# Patient Record
Sex: MALE | Race: WHITE | NOT HISPANIC OR LATINO | Employment: FULL TIME | URBAN - METROPOLITAN AREA
[De-identification: names, ages, dates, MRNs, and addresses within clinical notes are randomized per-mention and may not be internally consistent; named-entity substitution may affect disease eponyms.]

---

## 2023-04-03 PROBLEM — F39 MOOD DISORDER (CMS-HCC): Status: ACTIVE | Noted: 2023-04-03

## 2023-04-03 PROBLEM — U07.1 COVID-19: Status: ACTIVE | Noted: 2023-04-03

## 2023-04-03 PROBLEM — E66.01 CLASS 2 SEVERE OBESITY DUE TO EXCESS CALORIES WITH SERIOUS COMORBIDITY AND BODY MASS INDEX (BMI) OF 37.0 TO 37.9 IN ADULT (MULTI): Status: ACTIVE | Noted: 2023-04-03

## 2023-04-03 PROBLEM — E66.812 CLASS 2 SEVERE OBESITY DUE TO EXCESS CALORIES WITH SERIOUS COMORBIDITY AND BODY MASS INDEX (BMI) OF 37.0 TO 37.9 IN ADULT: Status: ACTIVE | Noted: 2023-04-03

## 2023-04-03 PROBLEM — J30.2 SEASONAL ALLERGIES: Status: ACTIVE | Noted: 2023-04-03

## 2023-04-03 RX ORDER — LITHIUM CARBONATE 300 MG/1
TABLET, FILM COATED, EXTENDED RELEASE ORAL
COMMUNITY
Start: 2022-08-12

## 2023-04-03 RX ORDER — LAMOTRIGINE 100 MG/1
1 TABLET ORAL DAILY
COMMUNITY
Start: 2022-08-12

## 2023-04-03 RX ORDER — ALBUTEROL SULFATE 90 UG/1
AEROSOL, METERED RESPIRATORY (INHALATION)
COMMUNITY
Start: 2022-08-12 | End: 2023-12-07 | Stop reason: WASHOUT

## 2023-04-03 RX ORDER — CLONAZEPAM 0.5 MG/1
TABLET ORAL
COMMUNITY
Start: 2022-08-12

## 2023-04-03 RX ORDER — CETIRIZINE HYDROCHLORIDE 10 MG/1
1 TABLET ORAL DAILY
COMMUNITY
Start: 2022-08-12 | End: 2023-12-07 | Stop reason: WASHOUT

## 2023-04-04 ENCOUNTER — OFFICE VISIT (OUTPATIENT)
Dept: PRIMARY CARE | Facility: CLINIC | Age: 33
End: 2023-04-04
Payer: COMMERCIAL

## 2023-04-04 VITALS
WEIGHT: 287 LBS | SYSTOLIC BLOOD PRESSURE: 127 MMHG | BODY MASS INDEX: 38.92 KG/M2 | HEART RATE: 76 BPM | DIASTOLIC BLOOD PRESSURE: 82 MMHG

## 2023-04-04 DIAGNOSIS — J30.2 SEASONAL ALLERGIES: ICD-10-CM

## 2023-04-04 DIAGNOSIS — H61.23 BILATERAL IMPACTED CERUMEN: ICD-10-CM

## 2023-04-04 DIAGNOSIS — R23.8 OTHER SKIN CHANGES: ICD-10-CM

## 2023-04-04 DIAGNOSIS — E66.01 CLASS 2 SEVERE OBESITY DUE TO EXCESS CALORIES WITH SERIOUS COMORBIDITY AND BODY MASS INDEX (BMI) OF 37.0 TO 37.9 IN ADULT (MULTI): ICD-10-CM

## 2023-04-04 DIAGNOSIS — J34.89 SINUS PRESSURE: Primary | ICD-10-CM

## 2023-04-04 DIAGNOSIS — F39 MOOD DISORDER (CMS-HCC): ICD-10-CM

## 2023-04-04 PROCEDURE — 3008F BODY MASS INDEX DOCD: CPT | Performed by: INTERNAL MEDICINE

## 2023-04-04 PROCEDURE — 99214 OFFICE O/P EST MOD 30 MIN: CPT | Performed by: INTERNAL MEDICINE

## 2023-04-04 PROCEDURE — 1036F TOBACCO NON-USER: CPT | Performed by: INTERNAL MEDICINE

## 2023-04-04 ASSESSMENT — PATIENT HEALTH QUESTIONNAIRE - PHQ9
2. FEELING DOWN, DEPRESSED OR HOPELESS: SEVERAL DAYS
1. LITTLE INTEREST OR PLEASURE IN DOING THINGS: SEVERAL DAYS
SUM OF ALL RESPONSES TO PHQ9 QUESTIONS 1 AND 2: 2
10. IF YOU CHECKED OFF ANY PROBLEMS, HOW DIFFICULT HAVE THESE PROBLEMS MADE IT FOR YOU TO DO YOUR WORK, TAKE CARE OF THINGS AT HOME, OR GET ALONG WITH OTHER PEOPLE: SOMEWHAT DIFFICULT

## 2023-04-04 NOTE — ASSESSMENT & PLAN NOTE
Related to flying in an airplane.  At this point considering symptoms have been present since the flight will check imaging is of the sinus for evaluation

## 2023-04-04 NOTE — PROGRESS NOTES
Subjective   Patient ID: Rashid Reyes is a 32 y.o. male who presents for Follow-up (E.R-follow up).    HPI     Patient is a 32-year-old male with past medical history of major depression seasonal allergies obesity who presents with chief complaint of headache that began after recent flight.  He denies any nasal congestion but does feel pain behind the maxillary sinus.  No drainage of the ears.  He went to the emergency room and a CT of the head was unremarkable.  He states that today the headache affecting the frontal sinuses on the right is about 80% better from when it initiated during the flight 1 month ago.  No fevers and chills and no significant drainage from the nasal passageways    Review of Systems  Constitutional: No fever or chills  Cardiovascular: no chest pain, no palpitations and no syncope.   Respiratory: no cough, no shortness of breath during exertion and no shortness of breath at rest.   Gastrointestinal: no abdominal pain, no nausea and no vomiting.  Neuro: No Headache, no dizziness    Objective   /82   Pulse 76   Wt 130 kg (287 lb)   BMI 38.92 kg/m²     Physical Exam  Constitutional: Alert and in no acute distress. Well developed, well nourished  Head and Face: Head and face: Normal.    Cardiovascular: Heart rate and rhythm were normal, normal S1 and S2. No peripheral edema.   Pulmonary: No respiratory distress. Clear bilateral breath sounds.  Musculoskeletal: Gait and station: Normal. Muscle strength/tone: Normal.   Skin: Normal skin color and pigmentation, normal skin turgor, and no rash.    Psychiatric: Judgment and insight: Intact. Mood and affect: Normal.        Lab Results   Component Value Date    WBC 4.7 10/06/2022    HGB 15.5 10/06/2022    HCT 47.3 10/06/2022     10/06/2022    CHOL 213 (H) 10/06/2022    TRIG 234 (H) 10/06/2022    HDL 35.3 (A) 10/06/2022    ALT 18 10/06/2022    AST 21 10/06/2022     10/06/2022    K 4.2 10/06/2022     10/06/2022     CREATININE 1.15 10/06/2022    BUN 16 10/06/2022    CO2 27 10/06/2022       No image results found.            Assessment/Plan

## 2023-04-04 NOTE — PROGRESS NOTES
Subjective   Patient ID: Rashid Reyes is a 32 y.o. male who presents for Follow-up (E.R-follow up).    HPI     Patient is a 32-year-old male with past medical history of major depression seasonal allergies obesity who presents with chief complaint of headache that began after recent flight.  He denies any nasal congestion but does feel pain behind the maxillary sinus.  No drainage of the ears.  He went to the emergency room and a CT of the head was unremarkable.  He states that today the headache affecting the frontal sinuses on the right is about 80% better from when it initiated during the flight 1 month ago.  No fevers and chills and no significant drainage from the nasal passageways  Review of Systems  Constitutional: No fever or chills  Cardiovascular: no chest pain, no palpitations and no syncope.   Respiratory: no cough, no shortness of breath during exertion and no shortness of breath at rest.   Gastrointestinal: no abdominal pain, no nausea and no vomiting.  Neuro: No Headache, no dizziness    Objective   /82   Pulse 76   Wt 130 kg (287 lb)   BMI 38.92 kg/m²     Physical Exam  Constitutional: Alert and in no acute distress. Well developed, well nourished  Head and Face: Head and face: Normal.    Cardiovascular: Heart rate and rhythm were normal, normal S1 and S2. No peripheral edema.   Pulmonary: No respiratory distress. Clear bilateral breath sounds.  Musculoskeletal: Gait and station: Normal. Muscle strength/tone: Normal.   Skin: Normal skin color and pigmentation, normal skin turgor, and no rash.    Psychiatric: Judgment and insight: Intact. Mood and affect: Normal.        Lab Results   Component Value Date    WBC 4.7 10/06/2022    HGB 15.5 10/06/2022    HCT 47.3 10/06/2022     10/06/2022    CHOL 213 (H) 10/06/2022    TRIG 234 (H) 10/06/2022    HDL 35.3 (A) 10/06/2022    ALT 18 10/06/2022    AST 21 10/06/2022     10/06/2022    K 4.2 10/06/2022     10/06/2022     CREATININE 1.15 10/06/2022    BUN 16 10/06/2022    CO2 27 10/06/2022       No image results found.            Assessment/Plan   Problem List Items Addressed This Visit          Endocrine/Metabolic    Class 2 severe obesity due to excess calories with serious comorbidity and body mass index (BMI) of 37.0 to 37.9 in adult (CMS/HCC)       Other    Mood disorder (CMS/[facility])     Stable controlled.  Continue following with psychiatry         Seasonal allergies    Sinus pressure - Primary     Related to flying in an airplane.  At this point considering symptoms have been present since the flight will check imaging is of the sinus for evaluation         Relevant Orders    XR paranasal sinuses 3+ views    Referral to ENT    Bilateral impacted cerumen     Referral to ENT.  Advised Debrox 3 times a day for 5 days         Relevant Orders    Referral to ENT    Other skin changes     Referral to dermatology         Relevant Orders    Referral to Dermatology

## 2023-04-11 ENCOUNTER — APPOINTMENT (OUTPATIENT)
Dept: LAB | Facility: LAB | Age: 33
End: 2023-04-11
Payer: COMMERCIAL

## 2023-04-11 LAB
ALBUMIN (G/DL) IN SER/PLAS: 4.4 G/DL (ref 3.4–5)
ANION GAP IN SER/PLAS: 13 MMOL/L (ref 10–20)
CALCIUM (MG/DL) IN SER/PLAS: 9.5 MG/DL (ref 8.6–10.6)
CARBON DIOXIDE, TOTAL (MMOL/L) IN SER/PLAS: 28 MMOL/L (ref 21–32)
CHLORIDE (MMOL/L) IN SER/PLAS: 102 MMOL/L (ref 98–107)
CHOLESTEROL (MG/DL) IN SER/PLAS: 205 MG/DL (ref 0–199)
CHOLESTEROL IN HDL (MG/DL) IN SER/PLAS: 36.3 MG/DL
CHOLESTEROL/HDL RATIO: 5.6
CREATININE (MG/DL) IN SER/PLAS: 1.1 MG/DL (ref 0.5–1.3)
ESTIMATED AVERAGE GLUCOSE FOR HBA1C: 88 MG/DL
GFR MALE: 90 ML/MIN/1.73M2
GLUCOSE (MG/DL) IN SER/PLAS: 81 MG/DL (ref 74–99)
HEMOGLOBIN A1C/HEMOGLOBIN TOTAL IN BLOOD: 4.7 %
LDL: 95 MG/DL (ref 0–99)
LITHIUM (MOL/L) IN SER/PLAS: 0.17 MMOL/L (ref 0.6–1.2)
NON HDL CHOLESTEROL: 169 MG/DL
PHOSPHATE (MG/DL) IN SER/PLAS: 3.4 MG/DL (ref 2.5–4.9)
POTASSIUM (MMOL/L) IN SER/PLAS: 4.7 MMOL/L (ref 3.5–5.3)
SODIUM (MMOL/L) IN SER/PLAS: 138 MMOL/L (ref 136–145)
THYROTROPIN (MIU/L) IN SER/PLAS BY DETECTION LIMIT <= 0.05 MIU/L: 1.95 MIU/L (ref 0.44–3.98)
TRIGLYCERIDE (MG/DL) IN SER/PLAS: 369 MG/DL (ref 0–149)
UREA NITROGEN (MG/DL) IN SER/PLAS: 16 MG/DL (ref 6–23)
VLDL: 74 MG/DL (ref 0–40)

## 2023-04-18 ENCOUNTER — LAB (OUTPATIENT)
Dept: LAB | Facility: LAB | Age: 33
End: 2023-04-18
Payer: COMMERCIAL

## 2023-04-18 LAB — TOBACCO SCREEN, URINE: NEGATIVE

## 2023-07-12 ENCOUNTER — APPOINTMENT (OUTPATIENT)
Dept: PRIMARY CARE | Facility: CLINIC | Age: 33
End: 2023-07-12
Payer: COMMERCIAL

## 2023-08-18 ENCOUNTER — OFFICE VISIT (OUTPATIENT)
Dept: PRIMARY CARE | Facility: CLINIC | Age: 33
End: 2023-08-18
Payer: COMMERCIAL

## 2023-08-18 VITALS
WEIGHT: 299 LBS | SYSTOLIC BLOOD PRESSURE: 114 MMHG | DIASTOLIC BLOOD PRESSURE: 73 MMHG | HEART RATE: 77 BPM | BODY MASS INDEX: 40.55 KG/M2 | TEMPERATURE: 98.8 F

## 2023-08-18 DIAGNOSIS — L98.9 SKIN LESION: ICD-10-CM

## 2023-08-18 DIAGNOSIS — R05.8 OTHER COUGH: ICD-10-CM

## 2023-08-18 DIAGNOSIS — J45.20 MILD INTERMITTENT ASTHMA, UNSPECIFIED WHETHER COMPLICATED (HHS-HCC): Primary | ICD-10-CM

## 2023-08-18 PROCEDURE — 99213 OFFICE O/P EST LOW 20 MIN: CPT | Performed by: INTERNAL MEDICINE

## 2023-08-18 PROCEDURE — 1036F TOBACCO NON-USER: CPT | Performed by: INTERNAL MEDICINE

## 2023-08-18 PROCEDURE — 3008F BODY MASS INDEX DOCD: CPT | Performed by: INTERNAL MEDICINE

## 2023-08-18 RX ORDER — METHYLPREDNISOLONE 4 MG/1
TABLET ORAL
Qty: 21 TABLET | Refills: 0 | Status: SHIPPED | OUTPATIENT
Start: 2023-08-18 | End: 2023-08-25

## 2023-08-18 RX ORDER — BENZONATATE 100 MG/1
100 CAPSULE ORAL 3 TIMES DAILY PRN
Qty: 21 CAPSULE | Refills: 0 | Status: SHIPPED | OUTPATIENT
Start: 2023-08-18 | End: 2023-08-25

## 2023-08-18 ASSESSMENT — ENCOUNTER SYMPTOMS: COUGH: 1

## 2023-08-18 NOTE — ASSESSMENT & PLAN NOTE
Patient likely has acute bronchitis versus asthma exacerbation in the setting of recent upper respiratory tract infection.  Will check chest x-ray.  Start Medrol Dosepak.  Adjust course pending results of x-ray.  Tessalon Perles for cough.  If symptoms get worse please go to the urgent care

## 2023-08-18 NOTE — PROGRESS NOTES
Subjective   Patient ID: Bernard Reyes is a 33 y.o. male who presents for Cough.    Cough         Patient is a 33-year-old male with past medical history of major depression seasonal allergies obesity who presents with chief complaint of ongoing cough x2 weeks.  He does report he had an upper respiratory tract infection 2 weeks ago.  For the most part he still feels a little bit fatigued and a little bit of shortness of breath.  He has been using his inhaler once daily with some improvement but he does not take a second dose later in the day because it prevents him from sleeping.  He denies any fevers chills nausea or vomiting.    Review of Systems  Constitutional: No fever or chills  Cardiovascular: no chest pain, no palpitations and no syncope.   Respiratory: cough, shortness of breath during exertion and no shortness of breath at rest.   Gastrointestinal: no abdominal pain, no nausea and no vomiting.  Neuro: No Headache, no dizziness    Objective   /73   Pulse 77   Temp 37.1 °C (98.8 °F)   Wt 136 kg (299 lb)   BMI 40.55 kg/m²     Physical Exam  Constitutional: Alert and in no acute distress. Well developed, well nourished  Head and Face: Head and face: Normal.    Cardiovascular: Heart rate and rhythm were normal, normal S1 and S2. No peripheral edema.   Pulmonary: Diffuse wheezing throughout  Musculoskeletal: Gait and station: Normal. Muscle strength/tone: Normal.   Skin: Normal skin color and pigmentation, normal skin turgor, and no rash.    Psychiatric: Judgment and insight: Intact. Mood and affect: Normal.        Lab Results   Component Value Date    WBC 4.7 10/06/2022    HGB 15.5 10/06/2022    HCT 47.3 10/06/2022     10/06/2022    CHOL 205 (H) 04/11/2023    TRIG 369 (H) 04/11/2023    HDL 36.3 (A) 04/11/2023    ALT 18 10/06/2022    AST 21 10/06/2022     04/11/2023    K 4.7 04/11/2023     04/11/2023    CREATININE 1.10 04/11/2023    BUN 16 04/11/2023    CO2 28 04/11/2023    TSH 1.95  04/11/2023    HGBA1C 4.7 04/11/2023       XR paranasal sinuses 3+ views  Narrative: Interpreted By:  IRIS FIELDS MD  MRN: [id number]  Patient Name: BONY CHUNG     STUDY:  SINUSES, COMPLETE MIN 3 VIEWS; ;  4/4/2023 10:22 am     INDICATION:  sinus pressure.     COMPARISON:  None.     ACCESSION NUMBER(S):  [id number]     ORDERING CLINICIAN:  KAYLEY NEAL     FINDINGS:  The visualized paranasal sinuses are well aerated.     No air-fluid levels are identified.     No mucoperiosteal thickening is seen.     Impression: Normal exam of the paranasal sinuses. If clinical concern persists,  further evaluation with sinus CT may be obtained.               Assessment/Plan     \Mild intermittent asthma with acute exacerbation  Patient likely has acute bronchitis versus asthma exacerbation in the setting of recent upper respiratory tract infection. Will check chest x-ray. Start Medrol Dosepak. Adjust course pending results of x-ray. Tessalon Perles for cough. If symptoms get worse please go to the urgent care

## 2023-08-21 ENCOUNTER — PATIENT MESSAGE (OUTPATIENT)
Dept: PRIMARY CARE | Facility: CLINIC | Age: 33
End: 2023-08-21
Payer: COMMERCIAL

## 2023-08-21 DIAGNOSIS — J18.9 COMMUNITY ACQUIRED PNEUMONIA, UNSPECIFIED LATERALITY: Primary | ICD-10-CM

## 2023-08-21 DIAGNOSIS — J15.9 COMMUNITY ACQUIRED BACTERIAL PNEUMONIA: Primary | ICD-10-CM

## 2023-08-21 RX ORDER — AMOXICILLIN AND CLAVULANATE POTASSIUM 875; 125 MG/1; MG/1
875 TABLET, FILM COATED ORAL 2 TIMES DAILY
Qty: 20 TABLET | Refills: 0 | Status: SHIPPED | OUTPATIENT
Start: 2023-08-21 | End: 2023-08-31

## 2023-08-22 RX ORDER — DOXYCYCLINE 100 MG/1
100 CAPSULE ORAL 2 TIMES DAILY
Qty: 4 CAPSULE | Refills: 0 | Status: SHIPPED | OUTPATIENT
Start: 2023-08-22 | End: 2023-08-24

## 2023-11-27 ENCOUNTER — LAB (OUTPATIENT)
Dept: LAB | Facility: LAB | Age: 33
End: 2023-11-27
Payer: COMMERCIAL

## 2023-11-27 DIAGNOSIS — Z79.899 OTHER LONG TERM (CURRENT) DRUG THERAPY: Primary | ICD-10-CM

## 2023-11-27 LAB
ANION GAP SERPL CALC-SCNC: 15 MMOL/L (ref 10–20)
BUN SERPL-MCNC: 15 MG/DL (ref 6–23)
CALCIUM SERPL-MCNC: 9.6 MG/DL (ref 8.6–10.6)
CHLORIDE SERPL-SCNC: 102 MMOL/L (ref 98–107)
CHOLEST SERPL-MCNC: 198 MG/DL (ref 0–199)
CHOLESTEROL/HDL RATIO: 4.8
CO2 SERPL-SCNC: 26 MMOL/L (ref 21–32)
CREAT SERPL-MCNC: 1.12 MG/DL (ref 0.5–1.3)
EST. AVERAGE GLUCOSE BLD GHB EST-MCNC: 88 MG/DL
GFR SERPL CREATININE-BSD FRML MDRD: 88 ML/MIN/1.73M*2
GLUCOSE SERPL-MCNC: 90 MG/DL (ref 74–99)
HBA1C MFR BLD: 4.7 %
HDLC SERPL-MCNC: 41 MG/DL
LDLC SERPL CALC-MCNC: 112 MG/DL
LITHIUM SERPL-SCNC: 0.18 MMOL/L (ref 0.6–1.2)
NON HDL CHOLESTEROL: 157 MG/DL (ref 0–149)
POTASSIUM SERPL-SCNC: 4.6 MMOL/L (ref 3.5–5.3)
SODIUM SERPL-SCNC: 138 MMOL/L (ref 136–145)
TRIGL SERPL-MCNC: 225 MG/DL (ref 0–149)
TSH SERPL-ACNC: 2.17 MIU/L (ref 0.44–3.98)
VLDL: 45 MG/DL (ref 0–40)

## 2023-11-27 PROCEDURE — 36415 COLL VENOUS BLD VENIPUNCTURE: CPT

## 2023-11-27 PROCEDURE — 83036 HEMOGLOBIN GLYCOSYLATED A1C: CPT

## 2023-11-27 PROCEDURE — 80048 BASIC METABOLIC PNL TOTAL CA: CPT

## 2023-11-27 PROCEDURE — 80061 LIPID PANEL: CPT

## 2023-11-27 PROCEDURE — 80178 ASSAY OF LITHIUM: CPT

## 2023-11-27 PROCEDURE — 84443 ASSAY THYROID STIM HORMONE: CPT

## 2023-12-07 ENCOUNTER — OFFICE VISIT (OUTPATIENT)
Dept: DERMATOLOGY | Facility: CLINIC | Age: 33
End: 2023-12-07
Payer: COMMERCIAL

## 2023-12-07 DIAGNOSIS — D22.9 MELANOCYTIC NEVUS, UNSPECIFIED LOCATION: ICD-10-CM

## 2023-12-07 DIAGNOSIS — D48.5 NEOPLASM OF UNCERTAIN BEHAVIOR OF SKIN: Primary | ICD-10-CM

## 2023-12-07 DIAGNOSIS — L73.8 OTHER SPECIFIED FOLLICULAR DISORDERS: ICD-10-CM

## 2023-12-07 PROCEDURE — 88305 TISSUE EXAM BY PATHOLOGIST: CPT | Mod: TC,DER | Performed by: STUDENT IN AN ORGANIZED HEALTH CARE EDUCATION/TRAINING PROGRAM

## 2023-12-07 PROCEDURE — 99204 OFFICE O/P NEW MOD 45 MIN: CPT | Performed by: STUDENT IN AN ORGANIZED HEALTH CARE EDUCATION/TRAINING PROGRAM

## 2023-12-07 PROCEDURE — 3008F BODY MASS INDEX DOCD: CPT | Performed by: STUDENT IN AN ORGANIZED HEALTH CARE EDUCATION/TRAINING PROGRAM

## 2023-12-07 PROCEDURE — 1036F TOBACCO NON-USER: CPT | Performed by: STUDENT IN AN ORGANIZED HEALTH CARE EDUCATION/TRAINING PROGRAM

## 2023-12-07 PROCEDURE — 11102 TANGNTL BX SKIN SINGLE LES: CPT | Performed by: STUDENT IN AN ORGANIZED HEALTH CARE EDUCATION/TRAINING PROGRAM

## 2023-12-07 PROCEDURE — 88305 TISSUE EXAM BY PATHOLOGIST: CPT | Performed by: DERMATOLOGY

## 2023-12-07 RX ORDER — LURASIDONE HYDROCHLORIDE 20 MG/1
40 TABLET, FILM COATED ORAL
COMMUNITY
Start: 2023-12-01 | End: 2024-04-29 | Stop reason: SDUPTHER

## 2023-12-07 RX ORDER — BENZOYL PEROXIDE 50 MG/ML
1 LIQUID TOPICAL DAILY
Qty: 236 G | Refills: 11 | Status: SHIPPED | OUTPATIENT
Start: 2023-12-07 | End: 2024-12-06

## 2023-12-07 RX ORDER — CLINDAMYCIN PHOSPHATE 10 UG/ML
LOTION TOPICAL DAILY
Qty: 60 ML | Refills: 11 | Status: SHIPPED | OUTPATIENT
Start: 2023-12-07 | End: 2024-12-06

## 2023-12-07 ASSESSMENT — DERMATOLOGY QUALITY OF LIFE (QOL) ASSESSMENT
DATE THE QUALITY-OF-LIFE ASSESSMENT WAS COMPLETED: 66815
RATE HOW EMOTIONALLY BOTHERED YOU ARE BY YOUR SKIN PROBLEM (FOR EXAMPLE, WORRY, EMBARRASSMENT, FRUSTRATION): 1
RATE HOW BOTHERED YOU ARE BY EFFECTS OF YOUR SKIN PROBLEMS ON YOUR ACTIVITIES (EG, GOING OUT, ACCOMPLISHING WHAT YOU WANT, WORK ACTIVITIES OR YOUR RELATIONSHIPS WITH OTHERS): 0 - NEVER BOTHERED
WHAT SINGLE SKIN CONDITION LISTED BELOW IS THE PATIENT ANSWERING THE QUALITY-OF-LIFE ASSESSMENT QUESTIONS ABOUT: NONE OF THE ABOVE
ARE THERE EXCLUSIONS OR EXCEPTIONS FOR THE QUALITY OF LIFE ASSESSMENT: NO
RATE HOW BOTHERED YOU ARE BY SYMPTOMS OF YOUR SKIN PROBLEM (EG, ITCHING, STINGING BURNING, HURTING OR SKIN IRRITATION): 0 - NEVER BOTHERED

## 2023-12-07 ASSESSMENT — DERMATOLOGY PATIENT ASSESSMENT
HAVE YOU HAD OR DO YOU HAVE A STAPH INFECTION: NO
ARE YOU AN ORGAN TRANSPLANT RECIPIENT: NO
DO YOU USE A TANNING BED: NO
DO YOU HAVE ANY NEW OR CHANGING LESIONS: YES
HAVE YOU HAD OR DO YOU HAVE VASCULAR DISEASE: NO

## 2023-12-07 ASSESSMENT — ITCH NUMERIC RATING SCALE: HOW SEVERE IS YOUR ITCHING?: 0

## 2023-12-07 ASSESSMENT — PATIENT GLOBAL ASSESSMENT (PGA): PATIENT GLOBAL ASSESSMENT: PATIENT GLOBAL ASSESSMENT:  1 - CLEAR

## 2023-12-07 NOTE — PROGRESS NOTES
Subjective     Bernard Reyes is a 33 y.o. male who presents for the following: Suspicious Skin Lesion (On left nostril, present for 2-3 months.).     Review of Systems:  No other skin or systemic complaints other than what is documented elsewhere in the note.    The following portions of the chart were reviewed this encounter and updated as appropriate:          Skin Cancer History  No skin cancer on file.      Specialty Problems          Dermatology Problems    Other skin changes        Objective   Well appearing patient in no apparent distress; mood and affect are within normal limits.    A focused skin examination was performed. All findings within normal limits unless otherwise noted below.    Assessment/Plan   1. Neoplasm of uncertain behavior of skin  Left Ala Nasi  2 mm bleeding pink papule          Lesion biopsy  Type of biopsy: tangential    Informed consent: discussed and consent obtained    Timeout: patient name, date of birth, surgical site, and procedure verified    Procedure prep:  Patient was prepped and draped  Anesthesia: the lesion was anesthetized in a standard fashion    Anesthetic:  1% lidocaine w/ epinephrine 1-100,000 local infiltration  Instrument used: DermaBlade    Hemostasis achieved with: aluminum chloride    Outcome: patient tolerated procedure well    Post-procedure details: sterile dressing applied and wound care instructions given    Dressing type: petrolatum and bandage      Staff Communication: Dermatology Local Anesthesia: 1 % Lidocaine / Epinephrine - Amount: 1 ml    Specimen 1 - Dermatopathology- DERM LAB  Differential Diagnosis: fibrous papule vs other  Check Margins Yes/No?:    Comments:    Dermpath Lab: Routine Histopathology (formalin-fixed tissue)    Concerning lesion found on exam. DDX for lesion includes: fibrous papule vs other. The need for biopsy to aid in diagnosis was discussed and recommended. Risks and benefits of biopsy were reviewed. See procedure  note.    Related Procedures  Follow Up In Dermatology - Established Patient    2. Melanocytic nevus, unspecified location  Benign to slightly atypical appearing brown pigmented macules and papules    Clinically benign appearing nevi, reassurance provided to the patient today. Discussed important of sun protection with sun protective clothing and/or broad spectrum sunscreen spf 30 or above.  ABCDEs of melanoma reviewed. Patient to f/u should they notice any new or changing pre-existing skin lesion.    3. Other specified follicular disorders (4)  Chest (Upper Torso, Anterior), Left Arm, Right Arm, Torso - Posterior (Back)  Follicular based papules and pustules    Start BPO 5% cleanser daily to affected area. Discussed risk of skin irritation and bleaching of towels/clothing.  Start clindamycin 1% lotion daily to affected area        Related Medications  benzoyl peroxide 5 % external wash  Apply 1 Application topically once daily. Dispense 240 mL    clindamycin (Cleocin T) 1 % lotion  Apply topically once daily. 60g      FU 6 months for folliculitis/potential early HS and skin check

## 2023-12-11 LAB
LABORATORY COMMENT REPORT: NORMAL
PATH REPORT.FINAL DX SPEC: NORMAL
PATH REPORT.GROSS SPEC: NORMAL
PATH REPORT.MICROSCOPIC SPEC OTHER STN: NORMAL
PATH REPORT.RELEVANT HX SPEC: NORMAL
PATH REPORT.TOTAL CANCER: NORMAL

## 2024-01-16 ENCOUNTER — OFFICE VISIT (OUTPATIENT)
Dept: PRIMARY CARE | Facility: CLINIC | Age: 34
End: 2024-01-16
Payer: COMMERCIAL

## 2024-01-16 VITALS
HEART RATE: 77 BPM | DIASTOLIC BLOOD PRESSURE: 74 MMHG | WEIGHT: 315 LBS | OXYGEN SATURATION: 98 % | SYSTOLIC BLOOD PRESSURE: 115 MMHG | BODY MASS INDEX: 44.21 KG/M2

## 2024-01-16 DIAGNOSIS — E66.01 CLASS 2 SEVERE OBESITY DUE TO EXCESS CALORIES WITH SERIOUS COMORBIDITY AND BODY MASS INDEX (BMI) OF 37.0 TO 37.9 IN ADULT (MULTI): ICD-10-CM

## 2024-01-16 DIAGNOSIS — J06.9 ACUTE RESPIRATORY DISEASE: Primary | ICD-10-CM

## 2024-01-16 DIAGNOSIS — H60.501 ACUTE OTITIS EXTERNA OF RIGHT EAR, UNSPECIFIED TYPE: ICD-10-CM

## 2024-01-16 DIAGNOSIS — J45.20 MILD INTERMITTENT ASTHMA, UNSPECIFIED WHETHER COMPLICATED (HHS-HCC): ICD-10-CM

## 2024-01-16 PROBLEM — U07.1 COVID-19: Status: RESOLVED | Noted: 2023-04-03 | Resolved: 2024-01-16

## 2024-01-16 PROBLEM — J34.89 SINUS PRESSURE: Status: RESOLVED | Noted: 2023-04-04 | Resolved: 2024-01-16

## 2024-01-16 PROCEDURE — 1036F TOBACCO NON-USER: CPT | Performed by: INTERNAL MEDICINE

## 2024-01-16 PROCEDURE — 3008F BODY MASS INDEX DOCD: CPT | Performed by: INTERNAL MEDICINE

## 2024-01-16 PROCEDURE — 99213 OFFICE O/P EST LOW 20 MIN: CPT | Performed by: INTERNAL MEDICINE

## 2024-01-16 RX ORDER — CIPROFLOXACIN AND DEXAMETHASONE 3; 1 MG/ML; MG/ML
4 SUSPENSION/ DROPS AURICULAR (OTIC) 2 TIMES DAILY
Qty: 7.5 ML | Refills: 0 | Status: SHIPPED | OUTPATIENT
Start: 2024-01-16 | End: 2024-01-23

## 2024-01-16 NOTE — PROGRESS NOTES
"Subjective   Patient ID: Rashid Reyes \"Bernard\" is a 33 y.o. male who presents for Earache (Patient here for ear and throat pain).    HPI     Patient is a 33-year-old male with past medical history of mood disorder asthma obesity who presents with chief complaint of nasal congestion sinus pressure and bilateral ear pain but mostly affecting the right ear.  No fevers or chills.  Symptom onset 2 days ago.  Has not tried any over-the-counter medications.    Review of Systems  Constitutional: No fever or chills  Cardiovascular: no chest pain, no palpitations and no syncope.   Respiratory: no cough, no shortness of breath during exertion and no shortness of breath at rest.   Gastrointestinal: no abdominal pain, no nausea and no vomiting.  Neuro: No Headache, no dizziness  ENT ear pain    Objective   /74   Pulse 77   Wt 148 kg (326 lb)   SpO2 98%   BMI 44.21 kg/m²     Physical Exam  Constitutional: Alert and in no acute distress. Well developed, well nourished  Head and Face: Head and face: Normal.    Cardiovascular: Heart rate and rhythm were normal, normal S1 and S2. No peripheral edema.   Pulmonary: No respiratory distress. Clear bilateral breath sounds.  Musculoskeletal: Gait and station: Normal. Muscle strength/tone: Normal.   Skin: Normal skin color and pigmentation, normal skin turgor, and no rash.    Psychiatric: Judgment and insight: Intact. Mood and affect: Normal.  ENT erythema of the external canal without effusion or pus        Lab Results   Component Value Date    WBC 4.7 10/06/2022    HGB 15.5 10/06/2022    HCT 47.3 10/06/2022     10/06/2022    CHOL 198 11/27/2023    TRIG 225 (H) 11/27/2023    HDL 41.0 11/27/2023    ALT 18 10/06/2022    AST 21 10/06/2022     11/27/2023    K 4.6 11/27/2023     11/27/2023    CREATININE 1.12 11/27/2023    BUN 15 11/27/2023    CO2 26 11/27/2023    TSH 2.17 11/27/2023    HGBA1C 4.7 11/27/2023       XR chest 2 view  Narrative: Interpreted By:  " HEATH DUNN MD  MRN: [id number]  Patient Name: BONY CHUNG     STUDY:  TH CHEST 2 VIEW PA AND LAT     INDICATION:  CHEST PAIN, UNSPECIFIED.     COMPARISON:  None     ACCESSION NUMBER(S):  [id number]     ORDERING CLINICIAN:  KAYLEY NEAL     FINDINGS:  Suspected area of patchy airspace opacity medial right lung base,  possibly a small focus of pneumonia or atelectasis.     No evidence of effusion. No pneumothorax. Heart size within normal  limits     Impression: Suspected area of patchy airspace opacity medial right lung base,  possibly a small focus of pneumonia or atelectasis.            Assessment/Plan   Problem List Items Addressed This Visit             ICD-10-CM    Class 2 severe obesity due to excess calories with serious comorbidity and body mass index (BMI) of 37.0 to 37.9 in adult (CMS/[facility]) E66.01, Z68.37    Relevant Orders    Referral to [facility]    Mild intermittent asthma J45.20     Other Visit Diagnoses         Codes    Acute respiratory disease    -  Primary J06.9    Acute otitis externa of right ear, unspecified type     H60.501    Relevant Medications    ciprofloxacin-dexamethasone (CiproDEX) otic suspension            Patient likely has an upper respiratory tract infection.  Explained that conservative care is recommended.  No symptoms that would be concerning for COVID or flu.  Tylenol for fevers drink plenty of fluids.  Will provide Ciprodex given erythema of the external canal on the right.  Follow-up if no improvement in 3 to 5 days

## 2024-02-03 SDOH — SOCIAL STABILITY: SOCIAL NETWORK: I HAVE TROUBLE DOING ALL OF THE FAMILY ACTIVITIES THAT I WANT TO DO: NEVER

## 2024-02-03 SDOH — SOCIAL STABILITY: SOCIAL NETWORK: I HAVE TROUBLE DOING ALL OF MY REGULAR LEISURE ACTIVITIES WITH OTHERS: NEVER

## 2024-02-03 SDOH — SOCIAL STABILITY: SOCIAL NETWORK: I HAVE TROUBLE DOING ALL OF MY USUAL WORK (INCLUDE WORK AT HOME): NEVER

## 2024-02-03 SDOH — SOCIAL STABILITY: SOCIAL NETWORK: I HAVE TROUBLE DOING ALL OF THE ACTIVITIES WITH FRIENDS THAT I WANT TO DO: NEVER

## 2024-02-03 SDOH — SOCIAL STABILITY: SOCIAL NETWORK

## 2024-02-03 SDOH — SOCIAL STABILITY: SOCIAL NETWORK: PROMIS ABILITY TO PARTICIPATE IN SOCIAL ROLES & ACTIVITIES T-SCORE: 64

## 2024-02-06 SDOH — ECONOMIC STABILITY: FOOD INSECURITY: WITHIN THE PAST 12 MONTHS, YOU WORRIED THAT YOUR FOOD WOULD RUN OUT BEFORE YOU GOT MONEY TO BUY MORE.: NEVER TRUE

## 2024-02-06 SDOH — ECONOMIC STABILITY: FOOD INSECURITY: WITHIN THE PAST 12 MONTHS, THE FOOD YOU BOUGHT JUST DIDN'T LAST AND YOU DIDN'T HAVE MONEY TO GET MORE.: NEVER TRUE

## 2024-02-06 ASSESSMENT — ANXIETY QUESTIONNAIRES
PAST MONTH HOW OFTEN HAVE YOU FELT CONFIDENT ABOUT YOUR ABILITY TO HANDLE YOUR PROBLEMS: 2 - SOMETIMES
PAST MONTH HOW OFTEN HAVE YOU FELT THAT YOU WERE UNABLE TO CONTROL THE IMPORTANT THINGS IN YOUR LIFE: 3 - FAIRLY OFTEN
PAST MONTH HOW OFTEN HAVE YOU FELT CONFIDENT ABOUT YOUR ABILITY TO HANDLE YOUR PROBLEMS: 2 - SOMETIMES
PAST MONTH HOW OFTEN HAVE YOU FELT DIFFICULTIES WERE PILING UP SO HIGH THAT YOU COULD NOT OVERCOME THEM: 3 - FAIRLY OFTEN
PAST MONTH HOW OFTEN HAVE YOU FELT THAT YOU WERE UNABLE TO CONTROL THE IMPORTANT THINGS IN YOUR LIFE: 3 - FAIRLY OFTEN
PAST MONTH HOW OFTEN HAVE YOU FELT THAT THINGS WERE GOING YOUR WAY: 1 - ALMOST NEVER

## 2024-02-06 ASSESSMENT — PROMIS GLOBAL HEALTH SCALE
EMOTIONAL_PROBLEMS: OFTEN
CARRYOUT_SOCIAL_ACTIVITIES: GOOD
RATE_AVERAGE_PAIN: 3
RATE_PHYSICAL_HEALTH: VERY GOOD
RATE_AVERAGE_FATIGUE: SEVERE
RATE_GENERAL_HEALTH: VERY GOOD
RATE_MENTAL_HEALTH: GOOD
CARRYOUT_PHYSICAL_ACTIVITIES: MOSTLY
RATE_SOCIAL_SATISFACTION: FAIR
RATE_QUALITY_OF_LIFE: GOOD

## 2024-02-07 ENCOUNTER — ALLIED HEALTH (OUTPATIENT)
Dept: INTEGRATIVE MEDICINE | Facility: CLINIC | Age: 34
End: 2024-02-07
Payer: COMMERCIAL

## 2024-02-07 ENCOUNTER — LAB (OUTPATIENT)
Dept: LAB | Facility: LAB | Age: 34
End: 2024-02-07
Payer: COMMERCIAL

## 2024-02-07 VITALS
BODY MASS INDEX: 44.76 KG/M2 | HEART RATE: 87 BPM | OXYGEN SATURATION: 98 % | WEIGHT: 315 LBS | SYSTOLIC BLOOD PRESSURE: 132 MMHG | DIASTOLIC BLOOD PRESSURE: 87 MMHG

## 2024-02-07 DIAGNOSIS — R06.83 SNORING: ICD-10-CM

## 2024-02-07 DIAGNOSIS — R53.82 CHRONIC FATIGUE: ICD-10-CM

## 2024-02-07 DIAGNOSIS — G47.19 EXCESSIVE DAYTIME SLEEPINESS: ICD-10-CM

## 2024-02-07 DIAGNOSIS — F39 MOOD DISORDER (CMS-HCC): ICD-10-CM

## 2024-02-07 PROBLEM — E66.01 CLASS 2 SEVERE OBESITY DUE TO EXCESS CALORIES WITH SERIOUS COMORBIDITY AND BODY MASS INDEX (BMI) OF 37.0 TO 37.9 IN ADULT (MULTI): Status: RESOLVED | Noted: 2023-04-03 | Resolved: 2024-02-07

## 2024-02-07 PROBLEM — R03.0 ELEVATED BLOOD PRESSURE READING WITHOUT DIAGNOSIS OF HYPERTENSION: Status: ACTIVE | Noted: 2024-02-07

## 2024-02-07 PROBLEM — E66.812 CLASS 2 SEVERE OBESITY DUE TO EXCESS CALORIES WITH SERIOUS COMORBIDITY AND BODY MASS INDEX (BMI) OF 37.0 TO 37.9 IN ADULT: Status: RESOLVED | Noted: 2023-04-03 | Resolved: 2024-02-07

## 2024-02-07 LAB
25(OH)D3 SERPL-MCNC: 32 NG/ML (ref 30–100)
FERRITIN SERPL-MCNC: 39 NG/ML (ref 20–300)
IRON SATN MFR SERPL: 16 % (ref 25–45)
IRON SERPL-MCNC: 63 UG/DL (ref 35–150)
TIBC SERPL-MCNC: 403 UG/DL (ref 240–445)
TSH SERPL-ACNC: 2.38 MIU/L (ref 0.44–3.98)
UIBC SERPL-MCNC: 340 UG/DL (ref 110–370)
VIT B12 SERPL-MCNC: 410 PG/ML (ref 211–911)

## 2024-02-07 PROCEDURE — 82607 VITAMIN B-12: CPT

## 2024-02-07 PROCEDURE — 36415 COLL VENOUS BLD VENIPUNCTURE: CPT

## 2024-02-07 PROCEDURE — 82728 ASSAY OF FERRITIN: CPT

## 2024-02-07 PROCEDURE — 99215 OFFICE O/P EST HI 40 MIN: CPT | Performed by: INTERNAL MEDICINE

## 2024-02-07 PROCEDURE — 84443 ASSAY THYROID STIM HORMONE: CPT

## 2024-02-07 PROCEDURE — 82306 VITAMIN D 25 HYDROXY: CPT

## 2024-02-07 PROCEDURE — 83540 ASSAY OF IRON: CPT

## 2024-02-07 PROCEDURE — 99417 PROLNG OP E/M EACH 15 MIN: CPT | Performed by: INTERNAL MEDICINE

## 2024-02-07 PROCEDURE — 83550 IRON BINDING TEST: CPT

## 2024-02-07 ASSESSMENT — ENCOUNTER SYMPTOMS
COUGH: 0
ARTHRALGIAS: 0
DIFFICULTY URINATING: 0
MYALGIAS: 0
NERVOUS/ANXIOUS: 1
SHORTNESS OF BREATH: 0
DYSURIA: 0
DIARRHEA: 0
HEADACHES: 0
BACK PAIN: 0
CONSTIPATION: 0
SLEEP DISTURBANCE: 1
APPETITE CHANGE: 0
WEAKNESS: 0
FATIGUE: 1
FEVER: 0

## 2024-02-07 NOTE — PROGRESS NOTES
"Integrative Medicine Visit:     Subjective   Patient ID: Rashid Reyes \"Bernard\" is a 33 y.o. male who presents for Weight Loss       HPI  Obesity: he has never weighed this much in his life. Lots of stress from his job.  Feeling burned out.     Feels exhausted. Struggled with weight loss for many years. Feels hopeless. He was on weight watchers in high school. Did [employer]. Tried noom. Lost weight. Does a lot of stress eating.        CONCERNS:  wants to lose weight. Interested in GLP-1 inhibitors and metformin to help.     Lab Results   Component Value Date    TSH 2.17 11/27/2023    No results found for: \"[id number]\"   Lab Results   Component Value Date    HGBA1C 4.7 11/27/2023     Lab Results   Component Value Date    WBC 4.7 10/06/2022    HGB 15.5 10/06/2022    HCT 47.3 10/06/2022    MCV 81 10/06/2022     10/06/2022      PMH:  depression  Chronic fatigue  Asthma  Elevated bp without hx of hypertension.     FamMH:  brother schizophrenic.  Mother treatment resistant depression.   Mom and patient had very low tsh. He thinks it might have been due to a viral infection but his mother eventually became hypothyroid.     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 Wife works part time as .     NUTRITION: does not eat breakfast; dinner: big bowl of cereal, stuffed shells;  lunch vegetarian chili leftover stir lyle; rice and saag paneer.   Snacks: chips in the past. Instead now having protein bars, had one piece of fruit yesterday.     Smoking:  former smoker, quit many  years ago smoked less than a pack per week at the time.     Alcohol use:  none    Exercise:   getting back into this; gone jogging at the gym. Goes to the [employer]. Was never in sports in the past. Wants to get back to exercise, especially strength    SLEEP: chronic issues with sleep. Has never had a sleep study. Does snore. Trying to go to bed an hour earlier than he was. Falls asleep behind the wheel. Excessive daytime " sleepiness. Has trouble with both falling asleep and staying asleep- wakes too early.     STRESS MANAGEMENT: did not have time to discuss. Sees private psychiatrist regularly. Recognizes that he feels quite stressed by his current circumstance   SUPPORT: wife    Review of Systems   Constitutional:  Positive for fatigue. Negative for appetite change and fever.   HENT:  Negative for congestion and hearing loss.    Eyes:  Negative for visual disturbance.   Respiratory:  Negative for cough and shortness of breath.    Cardiovascular:  Negative for chest pain and leg swelling.   Gastrointestinal:  Negative for constipation and diarrhea.   Genitourinary:  Negative for difficulty urinating, dysuria and urgency.   Musculoskeletal:  Negative for arthralgias, back pain and myalgias.   Skin:  Negative for rash.   Neurological:  Negative for weakness and headaches.   Psychiatric/Behavioral:  Positive for sleep disturbance. Negative for behavioral problems and suicidal ideas. The patient is nervous/anxious.         Chronic depression issues.             Pain:    Objective   /87 (BP Location: Left arm, Patient Position: Sitting, BP Cuff Size: Large adult)   Pulse 87   Wt 150 kg (330 lb)   SpO2 98%   BMI 44.76 kg/m²       Physical Exam  Constitutional:       Appearance: He is obese.      Comments: Appears quite pale.   HENT:      Head: Normocephalic and atraumatic.      Mouth/Throat:      Mouth: Mucous membranes are moist.   Cardiovascular:      Rate and Rhythm: Normal rate.   Pulmonary:      Effort: Pulmonary effort is normal. No respiratory distress.   Musculoskeletal:         General: No swelling or deformity.      Cervical back: Normal range of motion.   Skin:     General: Skin is dry.      Findings: No rash.   Neurological:      General: No focal deficit present.      Mental Status: He is alert and oriented to person, place, and time.   Psychiatric:         Mood and Affect: Mood normal.         Thought Content:  Thought content normal.         Judgment: Judgment normal.      Comments: Normal affect                      Assessment/Plan     Problem List Items Addressed This Visit             ICD-10-CM    Mood disorder (CMS/HCC) F39     Check labs and optimize vitamin d, iron, vitamin b12 if loww  Check sleep study  Start exercising.          Relevant Orders    Vitamin D 25-Hydroxy,Total (for eval of Vitamin D levels)    Vitamin B12    BMI 40.0-44.9, adult (CMS/HCC) - Primary Z68.41     Discussed low calorie density foods. Discussed sleep. Discussed exercise. Suggest referral to Dr. Kaylie Black for weight management medication assessment given meds that he takes which tend to increase weight gain.   Would like to discuss better stress management at future visits.          Relevant Orders    Home sleep apnea test (HSAT)    CBC and Auto Differential    Iron and TIBC    Ferritin     Other Visit Diagnoses         Codes    Excessive daytime sleepiness     G47.19    Relevant Orders    Home sleep apnea test (HSAT)    Vitamin B12    Snoring     R06.83    Relevant Orders    Home sleep apnea test (HSAT)    Chronic fatigue     R53.82    Relevant Orders    Thyroid Stimulating Hormone    CBC and Auto Differential    Iron and TIBC    Ferritin                Recommend Follow up in : 2-4 weeks.     Wendy Sena MD PhD    Time Spent  Prep time on day of patient encounter: 5 minutes  Time spent directly with patient, family or caregiver: 45 minutes  Additional Time Spent on Patient Care Activities: 0 minutes  Documentation Time: 10 minutes  Other Time Spent: 0 minutes  Total: 60 minutes

## 2024-02-07 NOTE — ASSESSMENT & PLAN NOTE
Discussed low calorie density foods. Discussed sleep. Discussed exercise. Suggest referral to Dr. Kaylie Black for weight management medication assessment given meds that he takes which tend to increase weight gain.   Would like to discuss better stress management at future visits.   Referral to [facility]

## 2024-02-07 NOTE — ASSESSMENT & PLAN NOTE
Check labs and optimize vitamin d, iron, vitamin b12 if loww  Check sleep study  Start exercising.

## 2024-02-07 NOTE — PATIENT INSTRUCTIONS
Start with trying to have 3 pieces of fruit per day.   How not to Diet by Dr. Luis Aleman  Try to drink a large glass of water before each meal  Eat something for breakfast.   Overnight oats or toast with peanut butter and fruit.  Get your sleep study  Suggest Dr. Kaylie Black  [facility]   Get your labs done.   Return to discuss labs and continue working on lifestyle  
Patient/Caregiver provided printed discharge information.

## 2024-02-14 ENCOUNTER — PROCEDURE VISIT (OUTPATIENT)
Dept: SLEEP MEDICINE | Facility: CLINIC | Age: 34
End: 2024-02-14
Payer: COMMERCIAL

## 2024-02-14 DIAGNOSIS — G47.33 OBSTRUCTIVE SLEEP APNEA (ADULT) (PEDIATRIC): ICD-10-CM

## 2024-02-14 DIAGNOSIS — G47.19 EXCESSIVE DAYTIME SLEEPINESS: ICD-10-CM

## 2024-02-14 DIAGNOSIS — R06.83 SNORING: ICD-10-CM

## 2024-02-14 PROCEDURE — 95806 SLEEP STUDY UNATT&RESP EFFT: CPT | Performed by: INTERNAL MEDICINE

## 2024-02-14 NOTE — PROGRESS NOTES
Type of Study: HOME SLEEP STUDY - NOMAD     The patient received equipment and instructions for use of the [employer] [facility] Nom[facility] HSAT device. The patient was instructed how to apply the effort belts, cannula, thermistor. It was also explained how the Nomad and oximeter components work.  The patient was asked to record their sleep for an 8-hour period.     The patient was informed of their responsibility for the device and acknowledged this by signing the HSAT device contract. The patient was asked to return the device on 02/15/2024 between the hours of 08:00 - 15:00  to the [facility].     The patient was instructed to call 911 as usual for any medical- emergencies while at home.  The patient was also given a phone number for troubleshooting when using the device in case there were additional questions.

## 2024-02-20 ENCOUNTER — TELEPHONE (OUTPATIENT)
Dept: INTERNAL MEDICINE | Facility: HOSPITAL | Age: 34
End: 2024-02-20
Payer: COMMERCIAL

## 2024-02-20 DIAGNOSIS — G47.33 MODERATE OBSTRUCTIVE SLEEP APNEA: Primary | ICD-10-CM

## 2024-02-20 DIAGNOSIS — R03.0 ELEVATED BLOOD PRESSURE READING WITHOUT DIAGNOSIS OF HYPERTENSION: ICD-10-CM

## 2024-02-20 NOTE — TELEPHONE ENCOUNTER
Spoke to patient by phone about results of sleep study. He has moderate sleep apnea.   Recommend that he see Dr. Eduardo Schroeder for treatment of his sleep apnea. Patient questions answered. Patient was given phone number to call to make appointment.     Wendy Sena MD PhD

## 2024-02-28 ENCOUNTER — TELEMEDICINE (OUTPATIENT)
Dept: INTEGRATIVE MEDICINE | Facility: CLINIC | Age: 34
End: 2024-02-28
Payer: COMMERCIAL

## 2024-02-28 VITALS — BODY MASS INDEX: 44.21 KG/M2 | WEIGHT: 315 LBS

## 2024-02-28 DIAGNOSIS — F39 MOOD DISORDER (CMS-HCC): ICD-10-CM

## 2024-02-28 PROCEDURE — 1036F TOBACCO NON-USER: CPT | Performed by: INTERNAL MEDICINE

## 2024-02-28 PROCEDURE — 3008F BODY MASS INDEX DOCD: CPT | Performed by: INTERNAL MEDICINE

## 2024-02-28 PROCEDURE — 99215 OFFICE O/P EST HI 40 MIN: CPT | Performed by: INTERNAL MEDICINE

## 2024-02-28 ASSESSMENT — ENCOUNTER SYMPTOMS
CONSTIPATION: 0
NERVOUS/ANXIOUS: 1
SHORTNESS OF BREATH: 0
FATIGUE: 1
MYALGIAS: 0
DIFFICULTY URINATING: 0
DIARRHEA: 0
WEAKNESS: 0
SLEEP DISTURBANCE: 1
COUGH: 0
BACK PAIN: 0
HEADACHES: 0
ARTHRALGIAS: 0
APPETITE CHANGE: 0
FEVER: 0
DYSURIA: 0

## 2024-02-28 NOTE — PATIENT INSTRUCTIONS
Start taking your psych meds with dinner instead of a night time snack. (Work on no evening snacking after dinner.)  Continue to eat breakfast  Brings fruits and veggies to snack on at work.   Nordic naturels omega 3 DHA algae supplement instead of the fish oil.   Suggest work with  to build more muscle.   Keep doing the daily five minute [facility] challenge  Follow up six weeks.

## 2024-02-28 NOTE — PROGRESS NOTES
"Integrative Medicine Follow-up Visit :     Subjective   Patient ID: Rashid Reyes \"Wu" is a 33 y.o. male who presents for No chief complaint on file.       HPI  He does not think he has had much weight loss. Last weight was 326 lb.  He is frustrated that he cannot lose weight more quickly.   Having trouble with getting out of breath when walking and thinks it is due to his weight.     Has appointment with sleep apnea. Feels tired during the day.   [facility]. Struggles to do five minutes of jumping jacks. At [place] [facility] and [place] [facility] or j[place]. Used to work out but hurt his back.   Has started eating breakfast. He thinks this helps him not snack as much at night. Working on less snacking at night.     Taking the vitamin b12 and iron and vitamin d.   Also taking fish oil   Also takes turmeric for joint pain.   [facility] for about five minutes per day.        Pain:denies    Review of Systems   Constitutional:  Positive for fatigue. Negative for appetite change and fever.   HENT:  Negative for congestion and hearing loss.    Eyes:  Negative for visual disturbance.   Respiratory:  Negative for cough and shortness of breath.    Cardiovascular:  Negative for chest pain and leg swelling.   Gastrointestinal:  Negative for constipation and diarrhea.   Genitourinary:  Negative for difficulty urinating, dysuria and urgency.   Musculoskeletal:  Negative for arthralgias, back pain and myalgias.   Skin:  Negative for rash.   Neurological:  Negative for weakness and headaches.   Psychiatric/Behavioral:  Positive for sleep disturbance. Negative for behavioral problems and suicidal ideas. The patient is nervous/anxious.         Chronic depression issues.                   Objective   Wt 148 kg (326 lb)   BMI 44.21 kg/m²     Physical Exam  Constitutional:       General: He is not in acute distress.     " Appearance: He is not ill-appearing, toxic-appearing or diaphoretic.   Pulmonary:      Effort: Pulmonary effort is normal. No respiratory distress.   Musculoskeletal:         General: No deformity.      Cervical back: Normal range of motion.      Comments: Upper extremities normal range of motion grossly   Skin:     Coloration: Skin is not jaundiced or pale.      Findings: No rash.   Psychiatric:         Mood and Affect: Mood normal.         Behavior: Behavior normal.                      Assessment/Plan     Problem List Items Addressed This Visit             ICD-10-CM    Mood disorder (CMS/[facility]) F39     Optimize vitamin d, iron, b12 level. Pt on supplement.s          BMI 40.0-44.9, adult (CMS/[facility]) - Primary Z68.41     Continue to work on healthier food choices. Eat breakfast  Take supplements.  Begin regular exercise program.             See AVS    Recommend Follow up in : 1 month    Wendy Sena MD PhD    Time Spent  Prep time on day of patient encounter: 5 minutes  Time spent directly with patient, family or caregiver: 30 minutes  Additional Time Spent on Patient Care Activities: 0 minutes  Documentation Time: 5 minutes  Other Time Spent: 0 minutes  Total: 40 minutes

## 2024-02-28 NOTE — ASSESSMENT & PLAN NOTE
Continue to work on healthier food choices. Eat breakfast  Take supplements.  Begin regular exercise program.

## 2024-03-06 ENCOUNTER — APPOINTMENT (OUTPATIENT)
Dept: PODIATRY | Facility: CLINIC | Age: 34
End: 2024-03-06
Payer: COMMERCIAL

## 2024-03-27 NOTE — PROGRESS NOTES
"       [facility]  [facility]  [id number]  [facility] [id number] [phone]     [facility]  New Visit Note      Subjective   Patient ID: Rashid Reyes \"Bernard\" is a 33 y.o. male with past medical history significant for Morbid Obesity, Elevated blood pressure reading.     4/3/2024: The patient is here alone today for a comprehensive sleep medicine evaluation due to sleep apnea and excessive daytime sleepiness/fatigue and to review his sleep study. He reports having sleep issues and daytime sleepiness. 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 His wife reports that he has been snoring at night and has gained a lot of weight in a few years. Today, he came to the clinic to establish care to treat his Obstructive sleep apnea. ESS: 12, HARRY: 10  today.    HPI  Patient had been having these symptoms for the past 1 years. Patient had Home Sleep Study in 2024 which showed NAHOMI but no CPAP started yet.      SLEEP STUDY HISTORY: (personally reviewed raw data such as interpretation report, data sheet, hypnogram, and titration table if available and applicable)  2/14/24: Home Sleep Study : REI3%: 15.2/hr, Supine REI3%: 21.5/hr, Shlomo: 85%, <88%: 2 minutes, consider 8-16 CWP    SLEEP-WAKE SCHEDULE  Bedtime: 11:30 PM on weekdays, MN on weekends  Subjective sleep latency: 10 minutes  Problems falling asleep: no  Number of awakenings: 0-1 times per night spontaneously for unknown reasons  Falls back asleep in 10 minutes  Problems staying asleep: no  Final wake time: 6:45 AM on weekdays, 9 AM on weekends  Out of bed time: 7 AM on weekdays, 9:30 AM on weekends  Naps: no  Average sleep duration (excluding naps): 7 hours    SLEEP ENVIRONMENT  Sleep location: bed  Sleep status: sleeps with wife  Room is dark:  Yes  Room is quiet: Yes  Room is cool: Yes  Bed comfort: good    SLEEP HABITS: " "  Activities before bedtime: watch TV  Activities in bed: no  Preferred sleep position: stomach    SLEEP ROS:  Night symptoms: POSITIVE for snoring, witnessed apnea, wake up gasping and/or choking for air, and nasal congestion   Morning symptoms: POSITIVE for unrefreshing sleep  Daytime symptoms POSITIVE for excessive daytime sleepiness, fatigue, and trouble remembering things in daytime  Hypersomnia / narcolepsy symptoms: Patient denies symptoms of a hypersomnolence disorder such as sleep paralysis, sleep-related hallucinations, recurrent sleep attacks, automatic behaviors, and cataplexy.   RLS symptoms: Patient denies RLS symptoms.  Movements in sleep: Patient denies problematic movements in sleep.  Parasomnia symptoms: POSITIVE for acting out dreams    WEIGHT: gained 10-15 lbs in 6 months     REVIEW OF SYSTEMS: All other systems have been reviewed and are negative.    PERTINENT SOCIAL HISTORY:  Occupation:  psychiatrist resdient  Smoking: No   ETOH: No   Marijuana: No   Caffeine: No  Sleep aids: Clonidine  Claustrophobia: No     PERTINENT PAST SURGICAL HISTORY:  non-contributory    PERTINENT FAMILY HISTORY:  Patient denies family history of any sleep disorder.    Active Problems, Allergy List, Medication List, and PMH/PSH/FH/Social Hx have been reviewed and reconciled in chart. No significant changes unless documented in the pertinent chart section. Updates made when necessary.       Objective   Vitals:    04/03/24 1428   BP: 120/78   Pulse: 87   Resp: 20   Temp: 36.3 °C (97.3 °F)   SpO2: 95%        Physical Exam  Constitutional:alert and oriented to time, place, and person  Sinus: - tenderness to palpation  Palate: Narrow Mallampati 2, + Tongue scalloping, + macroglossia  Lungs: Clear to auscultation bilateral, no rales  Heart: Regular rate and rhythm, no murmurs    Assessment/Plan   Rashid Reyes \"Bernard\" is a 33 y.o. male who is seen to evaluate for moderate obstructive sleep apnea. The pathophysiology " "of sleep apnea, diagnostic testing (HST vs PSG), treatment options (PAP, oral appliance, surgery, hypoglossal nerve stimulator called Inspire), and supportive management (weight loss, positional therapy, smoking cessation, avoidance of alcohol and sedatives) were discussed with the patient in detail. Risk factors of sleep apnea as well as cardiometabolic and neurocognitive sequelae associated with untreated sleep apnea were also discussed. Lastly, patient was advised to avoid driving vehicle or operating heavy machinery when sleepy.     Rashid Reyes \"Bernard\" with the following problems:     # OBSTRUCTIVE SLEEP APNEA: REI3%: 15.2/hr, Supine REI3%: 21.5/hr  -ROGE Salazar provides a P30i mask in the clinic, he reports comfortable,   -Start 5-15  cmH20 auto CPAP through [employer].  -Sleep apnea and PAP therapy education were provided at length in the clinic today. Rashid Reyes \"Bernard\" verbalized understanding.  -Emphasized diet, exercise, and weight loss in the clinic, as were non-supine sleep, avoiding alcohol in the late evening, and driving or operating heavy machinery when sleepy.  -Rashid Reyes \"Bernard\"verbalizes understanding of the above instructions and risks.    # DEPRESSION :  -Rashid Reyes \"Bernard\"is taking Lithium and participating in psychotherapy.  -Denies HI/SI     # MORBID OBESITY:  -with a BMI of 44.35. Rashid Reyes \"Bernard\" most recent Bicarbonate was 26 on 11/27/23  Bicarbonate   Date Value Ref Range Status   11/27/2023 26 21 - 32 mmol/L Final   -Encourage to have regular exercise to manage weight well.  -Refer to see a nutritionist     # NASAL CONGESTION:  -Instruct Rashid Reyes \"Bernard\"to use appropriate Flonase spray to ease congestion.  -Refer to Otolaryngology for underlying investigation.    RTC 1 month after receiving pap      All of patient's questions were answered. He verbalizes understanding and agreement with my assessment and plan.  "

## 2024-04-01 ENCOUNTER — TELEMEDICINE (OUTPATIENT)
Dept: INTEGRATIVE MEDICINE | Facility: CLINIC | Age: 34
End: 2024-04-01
Payer: COMMERCIAL

## 2024-04-01 DIAGNOSIS — F39 MOOD DISORDER (CMS-HCC): Primary | ICD-10-CM

## 2024-04-01 PROCEDURE — 99215 OFFICE O/P EST HI 40 MIN: CPT | Performed by: INTERNAL MEDICINE

## 2024-04-01 PROCEDURE — 3008F BODY MASS INDEX DOCD: CPT | Performed by: INTERNAL MEDICINE

## 2024-04-01 RX ORDER — LANOLIN ALCOHOL/MO/W.PET/CERES
1000 CREAM (GRAM) TOPICAL DAILY
COMMUNITY

## 2024-04-01 RX ORDER — ACETAMINOPHEN 500 MG
5000 TABLET ORAL DAILY
COMMUNITY

## 2024-04-01 ASSESSMENT — ENCOUNTER SYMPTOMS
COUGH: 0
DYSURIA: 0
HEADACHES: 0
SLEEP DISTURBANCE: 0
ARTHRALGIAS: 0
APPETITE CHANGE: 0
SHORTNESS OF BREATH: 0
DEPRESSION: 1
FEVER: 0
DIFFICULTY URINATING: 0
WEAKNESS: 0
MYALGIAS: 0
BACK PAIN: 0
FATIGUE: 0
NERVOUS/ANXIOUS: 0

## 2024-04-01 NOTE — ASSESSMENT & PLAN NOTE
Continue exercise. Continue follow up with psychiatry.  Concern that his meds are making it more difficult to lose weight. I am not sure that anything can be done about this.

## 2024-04-01 NOTE — ASSESSMENT & PLAN NOTE
See Dr. Black eventually for help with weihgt loss meds.  Has upcoming sleep medicine appointment to help with sleep apnea. Start treatment.  Work on quality of calories that he is eating - less packaged and continue to strive for more unprocessed fruits and vegetables.

## 2024-04-01 NOTE — PROGRESS NOTES
"Integrative Medicine Follow-up Visit :     Subjective   Patient ID: Rashid Reyes \"Bernard\" is a 33 y.o. male who presents for Depression and Obesity       DepressionPatient is not experiencing: nervousness/anxiety and shortness of breath.      Eating habits thrown off over vacation. Ate out a lot but he is back on track. Has lost four pounds. Not counting calories. Just trying to make better choices.   Goes to the gym two times per week. Still no . Went to the gym during vacation even. Realizes that he feels better when he exercises.   Wants to know about lavendar oil.     Not drinking any calories. Drinks water. Does not drink juice.   Drinking milk in his over night oats. Dairy milk. Over night oats with dried fruit- cranberries, raisins or figs. Walnuts or almonds.     Sleep : has been trying to sleep on his side until his upcoming sleep medicine appointment.          Pain:some knee pain  when he lifts weights.     Review of Systems   Constitutional:  Negative for appetite change, fatigue and fever.   HENT:  Negative for congestion and hearing loss.    Eyes:  Negative for visual disturbance.   Respiratory:  Negative for cough and shortness of breath.    Cardiovascular:  Negative for chest pain and leg swelling.   Genitourinary:  Negative for difficulty urinating, dysuria and urgency.   Musculoskeletal:  Negative for arthralgias, back pain and myalgias.   Skin:  Negative for rash.   Neurological:  Negative for weakness and headaches.   Psychiatric/Behavioral:  Positive for depression. Negative for behavioral problems and sleep disturbance. The patient is not nervous/anxious.                   Objective   There were no vitals taken for this visit.    Physical Exam  Constitutional:       General: He is not in acute distress.     Appearance: He is not ill-appearing, toxic-appearing or diaphoretic.   Pulmonary:      Effort: Pulmonary effort is normal. No respiratory distress.   Musculoskeletal:    "      General: No deformity.      Cervical back: Normal range of motion.      Comments: Upper extremities normal range of motion grossly   Skin:     Coloration: Skin is not jaundiced or pale.      Findings: No rash.   Psychiatric:         Mood and Affect: Mood normal.         Behavior: Behavior normal.                      Assessment/Plan     Problem List Items Addressed This Visit             ICD-10-CM    Mood disorder (CMS/[facility]) - Primary F39     Continue exercise. Continue follow up with psychiatry.  Concern that his meds are making it more difficult to lose weight. I am not sure that anything can be done about this.         BMI 40.0-44.9, adult (CMS/[facility]) Z68.41     See Dr. Wayne almonte for help with weihgt loss meds.  Has upcoming sleep medicine appointment to help with sleep apnea. Start treatment.  Work on quality of calories that he is eating - less packaged and continue to strive for more unprocessed fruits and vegetables.          Discussed exercise.  Discussed back pain from weight lifting.   Patient not taking supplements as regularly. Suggest restart.       Recommend Follow up in : 6 weeks    Wendy Sena MD PhD    Time Spent  Prep time on day of patient encounter: 3 minutes  Time spent directly with patient, family or caregiver: 37 minutes  Additional Time Spent on Patient Care Activities: 0 minutes  Documentation Time: 5 minutes  Other Time Spent: 0 minutes  Total: 45 minutes

## 2024-04-01 NOTE — PATIENT INSTRUCTIONS
Make overnight oats with soy milk instead of cow's milk.   Keep exercising. Would strive for three times per week.   Do Cat and cow stretch and back extensions for back issues.   See sleep medicine.   Get back to taking your supplements daily.   Follow up 2 months.   7. Try inclined treadmill with walking.   Wendy Sena MD PhD

## 2024-04-03 ENCOUNTER — OFFICE VISIT (OUTPATIENT)
Dept: SLEEP MEDICINE | Facility: HOSPITAL | Age: 34
End: 2024-04-03
Payer: COMMERCIAL

## 2024-04-03 VITALS
DIASTOLIC BLOOD PRESSURE: 78 MMHG | SYSTOLIC BLOOD PRESSURE: 120 MMHG | BODY MASS INDEX: 42.66 KG/M2 | WEIGHT: 315 LBS | HEIGHT: 72 IN | OXYGEN SATURATION: 95 % | TEMPERATURE: 97.3 F | RESPIRATION RATE: 20 BRPM | HEART RATE: 87 BPM

## 2024-04-03 DIAGNOSIS — F32.A DEPRESSION, UNSPECIFIED DEPRESSION TYPE: ICD-10-CM

## 2024-04-03 DIAGNOSIS — R09.81 NASAL CONGESTION: ICD-10-CM

## 2024-04-03 DIAGNOSIS — G47.33 OSA (OBSTRUCTIVE SLEEP APNEA): Primary | ICD-10-CM

## 2024-04-03 DIAGNOSIS — E66.01 MORBID OBESITY (MULTI): ICD-10-CM

## 2024-04-03 PROCEDURE — 1036F TOBACCO NON-USER: CPT

## 2024-04-03 PROCEDURE — 99214 OFFICE O/P EST MOD 30 MIN: CPT

## 2024-04-03 PROCEDURE — 3008F BODY MASS INDEX DOCD: CPT

## 2024-04-03 ASSESSMENT — SLEEP AND FATIGUE QUESTIONNAIRES
WAKING_TOO_EARLY: MILD
HOW LIKELY ARE YOU TO NOD OFF OR FALL ASLEEP IN A CAR, WHILE STOPPED FOR A FEW MINUTES IN TRAFFIC: SLIGHT CHANCE OF DOZING
HOW LIKELY ARE YOU TO NOD OFF OR FALL ASLEEP WHILE SITTING AND TALKING TO SOMEONE: WOULD NEVER DOZE
WORRIED_DISTRESSED_DUE_TO_SLEEP: A LITTLE
SITING INACTIVE IN A PUBLIC PLACE LIKE A CLASS ROOM OR A MOVIE THEATER: MODERATE CHANCE OF DOZING
ESS-CHAD TOTAL SCORE: 12
SLEEP_PROBLEM_INTERFERES_DAILY_ACTIVITIES: SOMEWHAT
HOW LIKELY ARE YOU TO NOD OFF OR FALL ASLEEP WHILE LYING DOWN TO REST IN THE AFTERNOON WHEN CIRCUMSTANCES PERMIT: MODERATE CHANCE OF DOZING
HOW LIKELY ARE YOU TO NOD OFF OR FALL ASLEEP WHILE WATCHING TV: MODERATE CHANCE OF DOZING
SLEEP_PROBLEM_NOTICEABLE_TO_OTHERS: SOMEWHAT
HOW LIKELY ARE YOU TO NOD OFF OR FALL ASLEEP WHILE SITTING QUIETLY AFTER LUNCH WITHOUT ALCOHOL: SLIGHT CHANCE OF DOZING
HOW LIKELY ARE YOU TO NOD OFF OR FALL ASLEEP WHILE SITTING AND READING: SLIGHT CHANCE OF DOZING
HOW LIKELY ARE YOU TO NOD OFF OR FALL ASLEEP WHEN YOU ARE A PASSENGER IN A CAR FOR AN HOUR WITHOUT A BREAK: HIGH CHANCE OF DOZING
SATISFACTION_WITH_CURRENT_SLEEP_PATTERN: DISSATISFIED
DIFFICULTY_FALLING_ASLEEP: MILD

## 2024-04-03 ASSESSMENT — PAIN SCALES - GENERAL: PAINLEVEL: 0-NO PAIN

## 2024-04-03 NOTE — PATIENT INSTRUCTIONS
"       [facility] Sleep Medicine  [facility]  [address]  [facility] [address] [phone]       Thank you for coming to the Sleep Medicine Clinic today! Your sleep medicine provider today was: TANA Charles Below is a summary of your treatment plan, patient education, other important information, and our contact numbers.    Dear Rashid Reyes \"Bernard\",    Thank you for visiting  Sleep Medicine Clinic !     1. According to your symptom and sleep study report. I will order the new PAP device for you to control your sleep apnea, feel free to contact your DME. If [employer] is your DME, you can reach them at [phone].   2. Please do not drive when you are sleepy and  start practicing the sleep hygiene  as discussed in clinic today.     3. Please start practicing the appropriate nasal spray at night to ease your nasal congestion as discussed in clinic today, if needed.    4. FOR QUESTIONS AND CONCERNS:   a) : In case of problems with machine or mask interface, please contact your DME company first. DME is the company who provides you the machine and/or PAP supplies / accessories. If [employer] is your DME, you can reach them at [phone].   b):  Please call my office with issues or questions: [phone] (Jack); [phone] (Sebastien); [phone] ([place]on)    If you have a CPAP or BiPAP machine at home, please bring the unit and all accessories including the power cord to your appointments unless I tell you otherwise. Please have knowledge of the DME company you worked with to receive your PAP device. If you have copies of any previous sleep testing completed outside of , please bring with you to clinic as well. This information will make our visits more productive.     If you are new to CPAP or BiPAP, please note the minimum usage insurance requires to continuing coverage for the equipment as " noted by your DME company. Please discuss equipment issues (PAP unit, mask fit, humidification, etc.) with your DME company first.       In the event that you are running more than 10 minutes late to your appointment, I will kindly ask you to reschedule. Thanks.      TREATMENT PLAN     Follow-up Appointment:   Follow-up in 31 days after getting new machine.    PATIENT EDUCATION     Obstructive Sleep Apnea (NAHOMI) is a sleep disorder where your upper airway muscles relax during sleep and the airway intermittently and repetitively narrows and collapses leading to partially blocked airway (hypopnea) or completely blocked airway (apnea) which, in turn, can disrupt breathing in sleep, lower oxygen levels while you sleep and cause night time wakings. Because both apnea and hypopnea may cause higher carbon dioxide or low oxygen levels, untreated NAHOMI can lead to heart arrhythmia, elevation of blood pressure, and make it harder for the body to consolidate memory and facilitate metabolism (leading to higher blood sugars at night). Frequent partial arousals occur during sleep resulting in sleep deprivation and daytime sleepiness. NAHOMI is associated with an increased risk of cardiovascular disease, stroke, hypertension, and insulin resistance. Moreover, untreated NAHOMI with excessive daytime sleepiness can increase the risk of motor vehicular accidents.    Some conservative strategies for NAHOMI regardless of NAHOMI severity are:   Positional therapy - Avoid sleeping on your back.   Healthy diet and regular exercise to optimize weight is highly encouraged.   Avoid alcohol late in the evening and sedative-hypnotics as these substances can make sleep apnea worse.   Improve breathing through the nose with intranasal steroid spray, saline rinse, or antihistamines    Safety: Avoid driving vehicle and operating heavy equipment while sleepy. Drowsy driving may lead to life-threatening motor vehicle accidents. A person driving while sleepy is 5  times more likely to have an accident. If you feel sleepy, pull over and take a short power nap (sleep for less than 30 minutes). Otherwise, ask somebody to drive you.    Treatment options for sleep apnea include weight management, positional therapy, Positive Airway Therapy (PAP) therapy, oral appliance therapy, hypoglossal nerve stimulator (Inspire) and select airway surgeries.    Starting Positive Airway Pressure (PAP): You were ordered a device to wear when you sleep called PAP (Positive Airway Pressure) to treat your sleep apnea. The order will be submitted to a durable medical equipment (DME) company who will arrange setting you up with the device. They will provide all the necessary equipment and discuss use and maintenance of the device with you as well as mask fitting and process of replacing / renewing PAP supplies or accessories. Once you get the machine, please start using it immediately. You may not be successful right away and that is okay. [place] be certain that you keep trying nightly and reach out to DME if you are struggling or having issues with machine usage.     *Please follow-up with me in 1-2 months of starting CPAP to see how well it is working for you and to do some troubleshooting if needed. Also, please bring all PAP equipment with you to follow up appointments unless told otherwise.     Important things to keep in mind as you start PAP:  Insurance will monitor your usage during the first 90 days. You should use your PAP - all night, every night, and including all naps (especially if naps are more than 30 minutes) for your health. The bare minimum is to use your PAP device while sleeping for at least 4 hours per day at least 5-6 days per week.. Otherwise, your PAP device will be reclaimed by your DME company at 90 days.  There are many masks to choose from to wear with your PAP machine. If you are not comfortable with the first mask issued to you, call your DME company and ask for another  option to try. You typically have a 30-day mask guarantee from the day you received your machine.   Discuss with your provider if you are having issues breathing with the machine or if the temperature or humidity feel uncomfortable.  Expect to have an adjustment period when you start your device. It helps to continuing wearing the machine every day for a period of time until you get more used to it. You can practice with wearing the mask alone if you need, then add in the PAP air pressure a few days later.   Reach out for help if you are struggling! The sleep medicine department can be reached at [phone]  We encourage you to download data monitoring apps to your phone. For PushToTest 10/11 - MyAir senia. For Searchmetrics - DreamMapper. Both apps are available in the Senia store for free and are a great tool to monitor your progress with your PAP device night to night.    Tips for success with PAP machine usage:  Comfortable and well-fitting mask  Appropriate pressure on the machine  Using humidification  Support from bed partner and clinical team      Maintaining your CPAP/BPAP device:    The humidification chamber (aka water tank or water chamber) needs to be filled with distilled water to prevent buildup of white deposits in the future. If you cannot find distilled water, you can use tap water but expect to have white deposits buildup seen after prolonged use with tap water. If you start seeing white deposits on the water chamber, you can clean it by filling it with equal parts of distilled white vinegar and water. Let the vinegar-water mixture sit for 2 hours, and then rinse it with running tap water. Clean with soap and water then let it dry.     You should try to keep your machine clean in order to work well. Here are some tips to clean PAP supplies / accessories:    Clean the humidification chamber (aka water tank) as well as your mask and tubing at least once a week with soap and water.    Alternatively, you can fill a sink or basin with warm water and add a little mild detergent, like Ivory dish soap. Gently wipe your supplies with the soapy water to free all the oils and dirt that may have collected. Once that's done, rinse these items with clean water until the soap is gone and let them air dry. You can hang your tubing over the curtain meri in your bathroom so that it dries.  The mask insert (part of the mask that has contact with your skin) needs to be cleaned with soap and water daily. Another option is to wipe them down with CPAP wipes or baby wipes.    You should replace your mask and tubing frequently in order to prevent bacteria buildup, machine damage, and mask seal issues. The older the mask and hose, the high likelihood that there is bacteria buildup in it especially if they are not cleaned regularly. Dirty filters damage machines because build-up of dust and contaminants can cause machine to over-heat, and in time, damage the motor of machine. Cushions lose their seal over time as most masks are made of plastic and silicone while headgear is made of neoprene. These materials will break down with age and frequent use. Here is the recommended replacement schedule for PAP supplies / accessories:    Twice a month- disposable filters and cushions for nasal mask or nasal pillows.  Once a month- cushion for full face mask  Every 3 months- mask with headgear and PAP tubing (standard or heated hose)  Every 6 months- reusable filter, water chamber, and chin strap     Other useful information:    Some people do not put water in the tank while other people prefers to put water in the tank to prevent mouth dryness. Try to experiment to determine which is more comfortable for you.   In general, new machines have 2 years warranty on parts while health insurance allows you to have a new machine once every 5 years.     Common issues with PAP machine:    Mask gets dislodged when turning to the side:  "Consider getting a CPAP pillow or switching to a mask with hose on top.     Dry mouth:  Your machine has built-in humidifier that heats up the air to prevent dry mouth. It can be adjusted to your comfort. You can try that first and increase setting one level one night at a time to check which setting is comfortable and effective in lessening dry mouth. In some patients with heated hose, adjusting tube temperature to make air warmer can improve dry mouth. If dry mouth persists despite adjusting humidity or tube temperature setting, may apply OTC Biotene gel over the gums at bedtime.  If Biotene gel is not effective, consider trying XEROSTOM gel from [employer].  Also, eliminate or reduce dose of medications that can cause dry mouth if possible. Lastly, may try getting a separate room humidifier machine.    Airleaks: Please call DME as they may need to adjust your mask or refit you with a different kind or different size of mask. In addition, you can ask DME for tips on getting a good mask seal and mask fit.     Difficulty tolerating the mask: Contact your DME to try a different kind of mask and/or call office to get a referral to Sleep Psychologist for CPAP desensitization. CPAP desensitization technique is a set of strategies that helps patient cope with claustrophobia and anxiety related to wearing mask. Alternatively, we can do a daytime mini-sleep study called PAP-nap trial wherein you will try on different kinds of mask and the sleep technician will try different pressure settings on CPAP and BPAP machines to see which specific pressure is tolerable and comfortable for you.     Water droplets or moisture within the hose and/or mask: This is called rain-out and it is caused by condensation of too much heated humidity on the cooler walls of the hose. If you have rain-out, turn down humidity settings or get a heated hose. If you already have a heated hose, turn up the \"tube temperature\" of the heated hose. " Alternatively, if you don't want to get a heated hose or warmer air, may wrap the CPAP hose with stockings to keep it somewhat warm. Also, you need to place the machine on the floor and lower the hose so that water won't travel upward towards your mask.     PAP desensitization techniques: If you have concerns about something being on your face at night, you can start by getting used to it before trying to sleep with it as follows:      Sit in a comfortable chair or bed. Connect the mask and hose to the CPAP/BiPAP machine. Hold the mask on your face (without straps on) and turn on the machine. Practice breathing with the mask on while awake sitting and watching television, reading, or performing a sedentary activity during the day for 5-10 minutes and then take it off.  If tolerated, try again and gradually build up to longer periods of time. If not tolerated, try and try again until it is more comfortable as you become more desensitized. If you are able to use it for at least 20-30 minutes, move unto the next step.     Sit in a comfortable chair or bed. Connect the mask and hose to the CPAP/BiPAP machine. Strap the mask on your head and turn on the machine. Practice breathing with the mask and headgear on while awake sitting and watching television, reading, or performing a sedentary activity. Start with 5-10 minutes and gradually increasing time until you can wear it comfortably for at least 20-30 minutes, then move to the next step.    Take a shorter daytime nap with machine turned on while you are in a reclined position in bed, sofa, or recliner. Start with 5-10 minute nap and gradually increase up to 30 minutes. It is not important whether you fall asleep or not. The goal is to rest comfortably with PAP machine on.     Reintroduce PAP machine into nighttime sleep. You can begin using it a portion of the night and gradually increase up to entire night.     Proceed from one step to the next only when you are  completely comfortable. If you feel any anxiety or discomfort, return to the previous step, then proceed again when comfortable.    Expect to “work” with your CPAP/BIPAP unit. It is important to try to relax when beginning CPAP/BIPAP therapy. Inhalation and exhalation should occur through the nose only. If you are unable to consistently breathe this way, do not panic or lose hope. There are other types of masks which allow you to breathe through your nose and/or your mouth. Also, in some patients, using intranasal steroid spray (e.g. Flonase or Nasocort or Fluticasone) 1 hour before bedtime and/or before putting on CPAP mask can help tolerate breathing through the mask.    Don't give up after a few attempts--some patients adjust quickly, while some patients need 3-4 weeks (or sometimes even longer) to be accustomed to CPAP therapy.  Contact your sleep medicine specialist if you have a significant change in weight since this may affect your pressure.    How to use nasal sprays properly: If you have nasal congestion or allergies, improving your breathing through the nose is critical for treating sleep apnea and improving your sleep. Hence, intranasal steroid spray like Flonase or Nasocort (generic name is Fluticasone) might help. In some patients with sleep apnea, using intranasal steroid spray allowed them to tolerate CPAP mask better.     Intranasal steroids are usually prescribed as 2 sprays per nostril 1 hour before bedtime. If you administer intranasal steroids too close to bedtime, it might not work as well.  If you have nasal congestion or allergies during day despite using Flonase, try adding Azelastine nasal spray 2 sprays per nostril in the morning. Alternatively, can consider adding over-the-counter non-sedating antihistamine medications.     However, if you have severe nasal congestion or allergies despite using both nasal sprays, consider seeing an allergy specialist to confirm which substance you are  sensitive to and also get definitive treatment which may be in the form of injections, oral pills, different kind of nose sprays, and/or a combination of everything said.     Below are instructions on how to use intranasal steroid spray properly:  Sit up or stand up with your face down.  Shake the spray bottle and insert its tip in one nostril.  Point the spray towards your ear, and not towards the middle of your nose. Pointing the tip upward and in the middle of the nose can lead to discomfort and irritation of nasal mucosa which can lead to nose bleeding or coughing up tinges of blood.  Squeeze the spray bottle and administer the recommended amount of spray.   Don't sniff when you spray. Remove the spray bottle.  Pinch your nose and hold it for a count of 10.   Perform steps 1-6 on the other nostril.    You can also go to the following EDUCATION WEBSITES for further information:   American Academy of Sleep Medicine http://sleepeducation.org  National Sleep Foundation: https://sleepfoundation.org  American Sleep Apnea Association: https://www.sleepapnea.org (for patients with sleep apnea)  Narcolepsy Network: https://www.narcolepsynetwork.org (for patients with narcolepsy)  [employer]: https://www.wakeupnarcolepsy.org (for patients with narcolepsy)  Hypersomnia Foundation: https://www.hypersomniafoundation.org (for patients with idiopathic hypersomnia)  RLS foundation: https://www.rls.org (for patients with restless leg syndrome)    IMPORTANT INFORMATION     Call 911 for medical emergencies.  Our offices are generally open from Monday-Friday, 8 am - 5 pm.   There are no supporting services by either the sleep doctors or their staff on weekends and Holidays, or after 5 PM on weekdays.   If you need to get in touch with me, you may either call my office number or you can use Filao.  If a referral for a test, for CPAP, or for another specialist was made, and you have not heard about scheduling this within  a week, please call scheduling at [phone] ([phone]).  If you are unable to make your appointment for clinic or an overnight study, kindly call the office or sleep testing center at least 48 hours in advance to cancel and reschedule.  If you are on CPAP, please bring your device's card and/or the device to each clinic appointment.   In case of problems with PAP machine or mask interface, please contact your DME (Durable Medical Equipment) company first. DME is the company who provides you the machine and/or PAP supplies.       PRESCRIPTIONS     We require 7 days advanced notice for prescription refills. If we do not receive the request in this time, we cannot guarantee that your medication will be refilled in time.    IMPORTANT PHONE NUMBERS     Sleep Medicine Clinic Fax: [phone]  Appointments (for Pediatric Sleep Clinic): [phone] - option 1  Appointments (for Adult Sleep Clinic): [phone] - option 2  Appointments (For Sleep Studies): [phone] - option 3  Behavioral Sleep Medicine: [phone]  Sleep Surgery: [phone]  Nutrition Service: [phone]  Weight management clinics with endocrinology: [phone]  Bariatric Services: [phone] (includes weight loss medications and weight loss surgery)  [facility]: [phone] (offers holistic approaches to weight management)  ENT (Otolaryngology): [phone]  Headache Clinic (Neurology): [phone]  Neurology: [phone]  Psychiatry: [phone]  Pulmonary Function Testing (PFT) Center: [phone]  Pulmonary Medicine: [phone]  [employer] (DME): [phone]      OUR SLEEP TESTING LOCATIONS     Our team will contact you to schedule your sleep study, however, you can contact us as follow:  Main Phone Line (scheduling only): [phone] ([phone]), option 3  Adult and Pediatric Locations  [facility] (6 years and older): [facility] - 4th floor ([address] [place] [address]  "[facility]) After hours line: [phone]  [facility] (Main campus: All ages): [facility], 6th floor. After hours line: [phone]   [place] (5 years and older; younger considered on case-by-case basis): [address]; [address], [address]. Scheduling  After hours line: [phone]   [place] (6 years and older): [address]; [address]; [address]   [place] (6 years and older): [address] [facility], [address]  After hours line: [phone]   [place] (13 years and older) in [place]: [address], 2nd floor  After hours line: [phone]   [place] (13 year and older): [address]  After hours line: [phone]     Adult Only Locations:   [place] (18 years and older): [address] [place] [address], 2nd floor   [place] (18 years and older): [address] [facility]; 4th floor  After hours line: [phone]   [place] (18 years and older) at [place]: [address] [facility]  After hours line: [phone]     CONTACTING YOUR SLEEP MEDICINE PROVIDER AND SLEEP TEAM      For issues with your machine or mask interface, please call your DME provider first. DME stands for durable medical company. DME is the company who provides you the machine and/or PAP supplies / accessories.   To schedule, cancel, or reschedule SLEEP STUDY APPOINTMENTS, please call the Main Phone Line at [phone] - option 3.   To schedule, cancel, or reschedule CLINIC APPOINTMENTS, you can do it in \"[employer]hart\", call [phone] to speak with my  (Kathia Solis), or call the Main Phone Line at [phone]) - option 2  For CLINICAL QUESTIONS or MEDICATION REFILLS, please call direct line for Adult Sleep Nurses at [phone].   Lastly, you can also send a message directly to your provider through \"My Chart\", which is the email service through your  Records Account: [email or web address]       [facility] we wish you a restful sleep!

## 2024-04-26 PROBLEM — J34.89 FRONTAL SINUS PAIN: Status: ACTIVE | Noted: 2024-04-26

## 2024-04-26 PROBLEM — R05.1 ACUTE COUGH: Status: ACTIVE | Noted: 2023-08-22

## 2024-04-26 PROBLEM — J31.0 CHRONIC RHINITIS: Status: ACTIVE | Noted: 2024-04-26

## 2024-04-26 PROBLEM — J06.9 ACUTE RESPIRATORY DISEASE: Status: ACTIVE | Noted: 2024-04-26

## 2024-04-26 PROBLEM — H60.501 ACUTE OTITIS EXTERNA OF RIGHT EAR: Status: ACTIVE | Noted: 2024-04-26

## 2024-04-26 PROBLEM — J34.2 DEVIATED NASAL SEPTUM: Status: ACTIVE | Noted: 2024-04-26

## 2024-04-29 ENCOUNTER — OFFICE VISIT (OUTPATIENT)
Dept: PODIATRY | Facility: HOSPITAL | Age: 34
End: 2024-04-29
Payer: COMMERCIAL

## 2024-04-29 VITALS
HEIGHT: 72 IN | DIASTOLIC BLOOD PRESSURE: 84 MMHG | BODY MASS INDEX: 42.66 KG/M2 | SYSTOLIC BLOOD PRESSURE: 143 MMHG | OXYGEN SATURATION: 94 % | WEIGHT: 315 LBS | HEART RATE: 76 BPM

## 2024-04-29 DIAGNOSIS — M76.822 POSTERIOR TIBIAL TENDON DYSFUNCTION (PTTD) OF BOTH LOWER EXTREMITIES: ICD-10-CM

## 2024-04-29 DIAGNOSIS — M76.821 POSTERIOR TIBIAL TENDON DYSFUNCTION (PTTD) OF BOTH LOWER EXTREMITIES: ICD-10-CM

## 2024-04-29 DIAGNOSIS — M21.42 PES PLANUS OF BOTH FEET: Primary | ICD-10-CM

## 2024-04-29 DIAGNOSIS — M21.41 PES PLANUS OF BOTH FEET: Primary | ICD-10-CM

## 2024-04-29 PROCEDURE — 99214 OFFICE O/P EST MOD 30 MIN: CPT | Performed by: STUDENT IN AN ORGANIZED HEALTH CARE EDUCATION/TRAINING PROGRAM

## 2024-04-29 RX ORDER — LURASIDONE HYDROCHLORIDE 40 MG/1
40 TABLET, FILM COATED ORAL
COMMUNITY
Start: 2024-04-17

## 2024-04-29 NOTE — PROGRESS NOTES
"  History Of Present Illness  Rashid Reyes \"Bernard\" is a 33 y.o. male presenting with bilateral foot pain. Patient states right foot pain is worse than left. Patient endorses pain is to the bottom of his foot bilaterally. Reports that he experiences pain with prolonged weightbearing and ambulating. Pain noted to be 6/10 when ambulating, and is stabbing in nature. Patient reports that he stretches in calves using a \"rocker wheel\" but states that stretching has become increasingly more difficult after injurying his back. Patient states recently that he started to workout and injured his back. Patient has tried over the counter orthotics which have provided brief relieve. Patient denies any other pedal complaints at this time.      Past Medical History  Patient Active Problem List    Diagnosis Date Noted    Acute otitis externa of right ear 04/26/2024    Acute respiratory disease 04/26/2024    Chronic rhinitis 04/26/2024    Deviated nasal septum 04/26/2024    Frontal sinus pain 04/26/2024    BMI 40.0-44.9, adult (Multi) 02/07/2024    Elevated blood pressure reading without diagnosis of hypertension 02/07/2024    Acute cough 08/22/2023    Mild intermittent asthma ([facility]) 08/18/2023    Bilateral impacted cerumen 04/04/2023    Other skin changes 04/04/2023    Mood disorder (CMS-HCC) 04/03/2023    Seasonal allergies 04/03/2023      Medications- OP  Current Outpatient Medications on File Prior to Visit   Medication Sig Dispense Refill    benzoyl peroxide 5 % external wash Apply 1 Application topically once daily. Dispense 240 mL 236 g 11    cholecalciferol (Vitamin D3) 5,000 Units tablet Take 1 tablet (5,000 Units) by mouth once daily.      clindamycin (Cleocin T) 1 % lotion Apply topically once daily. 60g 60 mL 11    clonazePAM (KlonoPIN) 0.5 mg tablet Take by mouth. TAKE 1/2 TO 1 TABLET AT BEDTIME AS NEEDED      cyanocobalamin (Vitamin B-12) 1,000 mcg tablet Take 1 tablet (1,000 mcg) by mouth once daily.      " lamoTRIgine (LaMICtal) 100 mg tablet Take 1 tablet (100 mg) by mouth once daily.      lithium ER (Lithobid) 300 mg 12 hr tablet Take 1 tab every 3rd day      lurasidone (Latuda) 40 mg tablet Take 1 tablet (40 mg) by mouth once daily with breakfast.      NON FORMULARY Methofolate      [DISCONTINUED] lurasidone (Latuda) 20 mg tablet Take 2 tablets (40 mg) by mouth once daily with breakfast.       No current facility-administered medications on file prior to visit.        Surgical History  He has a past surgical history that includes Other surgical history (10/06/2022).     Social History  He reports that he has quit smoking. His smoking use included cigarettes. He has been exposed to tobacco smoke. He has never used smokeless tobacco. He reports that he does not currently use alcohol. He reports that he does not use drugs.    Family History  Family History   Problem Relation Name Age of Onset    Other (dyslipidemia) Father      Hypertension Father      Other (neuroendocrince cancer) Father          Allergies  Patient has no known allergies.    Review of Systems    REVIEW OF SYSTEMS  A 10+ point ROS was completed and otherwise negative except as noted above and per HPI.       Last Recorded Vitals  Blood pressure 143/84, pulse 76, height 1.829 m (6'), weight 145 kg (319 lb 6.4 oz), SpO2 94%.      Physical Exam:    Vasc: DP and PT pulses are palpable bilateral.  CFT is less than 3 seconds bilateral.  Skin temperature is warm to cool proximal to distal bilateral. No erythema noted. Mild edema noted bilaterally. No lymphatic streaking noted B/L.    Neuro:  Light touch is intact to the foot bilateral.  Protective sensation is intact.  There is no clonus noted.  Denies any numbness, burning or tingling.     Derm: Nails 1-5 are intact.  Skin is supple with normal texture and turgor noted.  Webspaces are clean, dry and intact bilateral.  Hyperkeratoses noted to medial aspect of first IPJs b/l. There are no ulcerations. There  "are no verruca or other lesions noted.      MSK: Muscle strength is 5/5 for all pedal groups tested.  Ankle joint ROM is decreased Bilateral. 1st MPJ and lesser MPJ ROM is decreased without pain or crepitus. Flat foot b/l. Pain along the course of PT tendon bilaterally R>L         Lab Results   Component Value Date    WBC 4.7 10/06/2022    HGB 15.5 10/06/2022    HCT 47.3 10/06/2022    MCV 81 10/06/2022     10/06/2022       Lab Results   Component Value Date    GLUCOSE 90 11/27/2023    CALCIUM 9.6 11/27/2023     11/27/2023    K 4.6 11/27/2023    CO2 26 11/27/2023     11/27/2023    BUN 15 11/27/2023    CREATININE 1.12 11/27/2023       Lab Results   Component Value Date    HGBA1C 4.7 11/27/2023      No results found for: \"CRP\"   No results found for: \"SEDRATE\"  No components found for: \"CMP\"                     Assessment:  PTTD; bilaterally  Foot pain bilaterally  Pes planus deformity bilaterally    Plan:  -Patient seen and examined at bedside. All findings discussed with patient.   -Discussed the importance of continuing to wear good shoe gear, patient states he wears a 6E shoe and is will to try any shoe that he can fit into  -Xrays ordered  -Patient to continue stretching calves   -Discussed doing Yoga for 15 minutes a day, patient states that he is open to suggestion  -Patient has esophagitis but might be open to steroid if pain worsens  -Patient able to follow up in [place] for orthotic scanning    Case has been discussed with attending, A&P above reflect tentative plan. Please await final signature from attending physician on service.    Mj Quinteros, DPWILLIAM PGY-1  Please use Secure Chat if any questions    I saw and evaluated the patient. I personally obtained the key and critical portions of the history and physical exam or was physically present for key and critical portions performed by the resident/fellow. I reviewed the resident/fellow's documentation and discussed the patient with the " resident/fellow. I agree with the resident/fellow's medical decision making as documented in the note.    I spent 45 minutes in the professional and overall care of this patient.    Yola Holt DPM

## 2024-05-20 ENCOUNTER — TELEMEDICINE (OUTPATIENT)
Dept: INTEGRATIVE MEDICINE | Facility: CLINIC | Age: 34
End: 2024-05-20
Payer: COMMERCIAL

## 2024-05-20 VITALS — WEIGHT: 315 LBS | BODY MASS INDEX: 43.26 KG/M2

## 2024-05-20 DIAGNOSIS — F39 MOOD DISORDER (CMS-HCC): ICD-10-CM

## 2024-05-20 DIAGNOSIS — G47.33 MODERATE OBSTRUCTIVE SLEEP APNEA: ICD-10-CM

## 2024-05-20 PROCEDURE — 99215 OFFICE O/P EST HI 40 MIN: CPT | Performed by: INTERNAL MEDICINE

## 2024-05-20 PROCEDURE — 1036F TOBACCO NON-USER: CPT | Performed by: INTERNAL MEDICINE

## 2024-05-20 PROCEDURE — 3008F BODY MASS INDEX DOCD: CPT | Performed by: INTERNAL MEDICINE

## 2024-05-20 ASSESSMENT — ENCOUNTER SYMPTOMS
NERVOUS/ANXIOUS: 1
DYSURIA: 0
SLEEP DISTURBANCE: 0
APPETITE CHANGE: 0
ARTHRALGIAS: 0
WEAKNESS: 0
MYALGIAS: 0
DIFFICULTY URINATING: 0
FEVER: 0
HEADACHES: 0
COUGH: 0
BACK PAIN: 0
SHORTNESS OF BREATH: 0
FATIGUE: 0

## 2024-05-20 NOTE — PATIENT INSTRUCTIONS
"Get back into the \"diet\" mode by not taking ice cream at bedtime; skip bedtime snacking as much as possible.   Take latuda with dinner instead of bedtime 350 calorie snack.   Get NAHOMI set up and start using this.   See Dr. Black for possible weight loss meds evaluation.   See me in 4 months.   Continue to exercise 3 times per week  Try to go to bed a bit earlier.   Wendy Sena MD PhD   "

## 2024-05-20 NOTE — PROGRESS NOTES
"Integrative Medicine Follow-up Visit :     Subjective   Patient ID: Rashid Reyes \"Wu" is a 33 y.o. male who presents for No chief complaint on file.       HPI    Working night shift lately. Having trouble with weight loss.   Is working out 3 times per week.       Still goes to bed quite late around 11:30 pm.  Eating a 350 calorie snack couple hours before bed so that he can take his lurasidone which requires that you eat food with it. Usually ice cream. He eats ice cream while he watches tv. He realizes eating ice cream is a habit. Sometimes he forsces himself to have a snack so he can take his medication.     [place]: still trying to get his cpap machine. Company cancelled his appointment. He will see them as soon as possible. He thinks his sleep is a bit better since losing ten pounds. Has not become sleepy when he drives.      Trying to eat a lot of foods with vegetables.   Does not miss junk food if he eats packed lunch.     Pain:denies    Review of Systems   Constitutional:  Negative for appetite change, fatigue and fever.   HENT:  Negative for congestion and hearing loss.    Eyes:  Negative for visual disturbance.   Respiratory:  Negative for cough and shortness of breath.    Cardiovascular:  Negative for chest pain and leg swelling.   Genitourinary:  Negative for difficulty urinating, dysuria and urgency.   Musculoskeletal:  Negative for arthralgias, back pain and myalgias.   Skin:  Negative for rash.   Neurological:  Negative for weakness and headaches.   Psychiatric/Behavioral:  Negative for behavioral problems, sleep disturbance and suicidal ideas. The patient is nervous/anxious (depression).                   Objective   Wt 145 kg (319 lb)   BMI 43.26 kg/m²     Physical Exam  Constitutional:       General: He is not in acute distress.     Appearance: He is obese. He is not ill-appearing, toxic-appearing or diaphoretic.   Pulmonary:      Effort: Pulmonary effort is normal. No respiratory distress. " "  Musculoskeletal:         General: No deformity.      Cervical back: Normal range of motion.      Comments: Upper extremities normal range of motion grossly   Skin:     Coloration: Skin is not jaundiced or pale.      Findings: No rash.   Psychiatric:         Mood and Affect: Mood normal.         Behavior: Behavior normal.                      Assessment/Plan     Problem List Items Addressed This Visit             ICD-10-CM    Mood disorder (CMS-HCC) F39    BMI 40.0-44.9, adult (Multi) - Primary Z68.41    Moderate obstructive sleep apnea G47.33     Start cpap.   Wendy Sena MD PhD         See AVS.   Get back into the \"diet\" mode by not taking ice cream at bedtime; skip bedtime snacking as much as possible.   Take latuda with dinner instead of bedtime 350 calorie snack.   Get NAHOMI set up and start using this.   See Dr. Black for possible weight loss meds evaluation.   See me in 4 months.   Continue to exercise 3 times per week  Try to go to bed a bit earlier.   Wendy Sena MD PhD       Recommend Follow up in : 4 months    Wendy Sena MD PhD    Time Spent  Prep time on day of patient encounter: 5 minutes  Time spent directly with patient, family or caregiver: 30 minutes  Additional Time Spent on Patient Care Activities: 0 minutes  Documentation Time: 5 minutes  Other Time Spent: 0 minutes  Total: 40 minutes                            "

## 2024-05-23 ENCOUNTER — APPOINTMENT (OUTPATIENT)
Dept: SLEEP MEDICINE | Facility: HOSPITAL | Age: 34
End: 2024-05-23
Payer: COMMERCIAL

## 2024-06-10 ASSESSMENT — DERMATOLOGY QUALITY OF LIFE (QOL) ASSESSMENT
RATE HOW BOTHERED YOU ARE BY EFFECTS OF YOUR SKIN PROBLEMS ON YOUR ACTIVITIES (EG, GOING OUT, ACCOMPLISHING WHAT YOU WANT, WORK ACTIVITIES OR YOUR RELATIONSHIPS WITH OTHERS): 0 - NEVER BOTHERED
RATE HOW BOTHERED YOU ARE BY SYMPTOMS OF YOUR SKIN PROBLEM (EG, ITCHING, STINGING BURNING, HURTING OR SKIN IRRITATION): 1
RATE HOW EMOTIONALLY BOTHERED YOU ARE BY YOUR SKIN PROBLEM (FOR EXAMPLE, WORRY, EMBARRASSMENT, FRUSTRATION): 5
DATE THE QUALITY-OF-LIFE ASSESSMENT WAS COMPLETED: 67001
RATE HOW BOTHERED YOU ARE BY EFFECTS OF YOUR SKIN PROBLEMS ON YOUR ACTIVITIES (EG, GOING OUT, ACCOMPLISHING WHAT YOU WANT, WORK ACTIVITIES OR YOUR RELATIONSHIPS WITH OTHERS): 0 - NEVER BOTHERED
WHAT SINGLE SKIN CONDITION LISTED BELOW IS THE PATIENT ANSWERING THE QUALITY-OF-LIFE ASSESSMENT QUESTIONS ABOUT: DERMATITIS
RATE HOW BOTHERED YOU ARE BY SYMPTOMS OF YOUR SKIN PROBLEM (EG, ITCHING, STINGING BURNING, HURTING OR SKIN IRRITATION): 1
RATE HOW EMOTIONALLY BOTHERED YOU ARE BY YOUR SKIN PROBLEM (FOR EXAMPLE, WORRY, EMBARRASSMENT, FRUSTRATION): 5
WHAT SINGLE SKIN CONDITION LISTED BELOW IS THE PATIENT ANSWERING THE QUALITY-OF-LIFE ASSESSMENT QUESTIONS ABOUT: DERMATITIS

## 2024-06-12 ENCOUNTER — APPOINTMENT (OUTPATIENT)
Dept: DERMATOLOGY | Facility: CLINIC | Age: 34
End: 2024-06-12
Payer: COMMERCIAL

## 2024-06-12 DIAGNOSIS — L64.9 ANDROGENIC ALOPECIA: Primary | ICD-10-CM

## 2024-06-12 DIAGNOSIS — D48.5 NEOPLASM OF UNCERTAIN BEHAVIOR OF SKIN: ICD-10-CM

## 2024-06-12 DIAGNOSIS — L21.9 SEBORRHEIC DERMATITIS: ICD-10-CM

## 2024-06-12 DIAGNOSIS — L73.8 OTHER SPECIFIED FOLLICULAR DISORDERS: ICD-10-CM

## 2024-06-12 PROCEDURE — 3008F BODY MASS INDEX DOCD: CPT | Performed by: STUDENT IN AN ORGANIZED HEALTH CARE EDUCATION/TRAINING PROGRAM

## 2024-06-12 PROCEDURE — 99214 OFFICE O/P EST MOD 30 MIN: CPT | Performed by: STUDENT IN AN ORGANIZED HEALTH CARE EDUCATION/TRAINING PROGRAM

## 2024-06-12 RX ORDER — CLINDAMYCIN PHOSPHATE 10 UG/ML
LOTION TOPICAL DAILY
Qty: 60 ML | Refills: 11 | Status: SHIPPED | OUTPATIENT
Start: 2024-06-12 | End: 2025-06-12

## 2024-06-12 RX ORDER — KETOCONAZOLE 20 MG/G
CREAM TOPICAL 2 TIMES DAILY
Qty: 45 G | Refills: 11 | Status: SHIPPED | OUTPATIENT
Start: 2024-06-12

## 2024-06-12 RX ORDER — LURASIDONE HYDROCHLORIDE 20 MG/1
TABLET, FILM COATED ORAL
COMMUNITY
Start: 2024-06-05

## 2024-06-12 RX ORDER — MINOXIDIL 2.5 MG/1
1.25 TABLET ORAL DAILY
Qty: 45 TABLET | Refills: 3 | Status: SHIPPED | OUTPATIENT
Start: 2024-06-12 | End: 2025-06-12

## 2024-06-12 RX ORDER — BENZOYL PEROXIDE 50 MG/ML
1 LIQUID TOPICAL DAILY
Qty: 236 G | Refills: 11 | Status: SHIPPED | OUTPATIENT
Start: 2024-06-12 | End: 2025-06-12

## 2024-06-12 NOTE — PROGRESS NOTES
Subjective     Bernard Reyes is a 34 y.o. male who presents for the following: Rash (Follow up for folliculitis used BPO 5% cleanser with good response; states he rarely used the clindamycin 1% lotion/Reports new area to face red, irrated, flaky with papules; PCP recommended [place] pt stated used twice discontinue due to reaction also affecting scalp ).     Review of Systems:  No other skin or systemic complaints other than what is documented elsewhere in the note.    The following portions of the chart were reviewed this encounter and updated as appropriate:          Skin Cancer History  No skin cancer on file.      Specialty Problems          Dermatology Problems    Other skin changes        Objective   Well appearing patient in no apparent distress; mood and affect are within normal limits.    A focused skin examination was performed. All findings within normal limits unless otherwise noted below.    Assessment/Plan   1. Androgenic alopecia  Scalp  Thinning of frontal scalp    Used minoxidil for years in his 20s  Caused irritation of scalp  Discussed r/b/se of low dose oral minoxidil. This medication is taken daily and can result in decreased shedding and increased growth in some patients. Side effects include decreased BP, lightheadedness, hypertrichosis, swelling, weight gain, and edema around the heart. Discussed warning signs of swelling, chest pain, difficulty breathing. Patient to call office if they experience these symptoms.     Start 1.25 mg minoxidil daily (1/2 2.5 mg pill)          minoxidil (Loniten) 2.5 mg tablet - Scalp  Take 0.5 tablets (1.25 mg) by mouth once daily.    2. Neoplasm of uncertain behavior of skin    Related Procedures  Follow Up In Dermatology - Established Patient    3. Seborrheic dermatitis  Left Alar Crease, Right Alar Crease  Erythema with overlying greasy scale.    Discussed the chronic and relapsing nature of the condition. Counseled on relation to normal yeast species on  skin and body's immune reaction to it. Discussed that goal is control, not cure, of condition.     Start ketoconazole 2% cream BID to affected area.  OTC dandruff shampoo      ketoconazole (NIZOral) 2 % cream - Left Alar Crease, Right Alar Crease  Apply topically 2 times a day. To face    4. Other specified follicular disorders (2)  Left Leg; Right Leg    Related Medications  clindamycin (Cleocin T) 1 % lotion  Apply topically once daily. 60g    benzoyl peroxide 5 % external wash  Apply 1 Application topically once daily.

## 2024-07-23 ENCOUNTER — OFFICE VISIT (OUTPATIENT)
Dept: PRIMARY CARE | Facility: CLINIC | Age: 34
End: 2024-07-23
Payer: COMMERCIAL

## 2024-07-23 VITALS
WEIGHT: 315 LBS | HEART RATE: 75 BPM | BODY MASS INDEX: 42.66 KG/M2 | DIASTOLIC BLOOD PRESSURE: 83 MMHG | HEIGHT: 72 IN | SYSTOLIC BLOOD PRESSURE: 122 MMHG

## 2024-07-23 DIAGNOSIS — F39 MOOD DISORDER (CMS-HCC): ICD-10-CM

## 2024-07-23 DIAGNOSIS — G47.19 EXCESSIVE DAYTIME SLEEPINESS: ICD-10-CM

## 2024-07-23 DIAGNOSIS — R53.82 CHRONIC FATIGUE: ICD-10-CM

## 2024-07-23 PROCEDURE — 99244 OFF/OP CNSLTJ NEW/EST MOD 40: CPT | Performed by: FAMILY MEDICINE

## 2024-07-23 PROCEDURE — 3008F BODY MASS INDEX DOCD: CPT | Performed by: FAMILY MEDICINE

## 2024-07-23 RX ORDER — METFORMIN HYDROCHLORIDE 500 MG/1
TABLET ORAL
Qty: 100 TABLET | Refills: 3 | Status: SHIPPED | OUTPATIENT
Start: 2024-07-23 | End: 2024-10-05

## 2024-07-23 ASSESSMENT — COLUMBIA-SUICIDE SEVERITY RATING SCALE - C-SSRS
6. HAVE YOU EVER DONE ANYTHING, STARTED TO DO ANYTHING, OR PREPARED TO DO ANYTHING TO END YOUR LIFE?: NO
2. HAVE YOU ACTUALLY HAD ANY THOUGHTS OF KILLING YOURSELF?: NO
1. IN THE PAST MONTH, HAVE YOU WISHED YOU WERE DEAD OR WISHED YOU COULD GO TO SLEEP AND NOT WAKE UP?: NO

## 2024-07-23 ASSESSMENT — PAIN SCALES - GENERAL: PAINLEVEL: 0-NO PAIN

## 2024-07-23 NOTE — PROGRESS NOTES
Rashid Lewis is a 34 y.o. male here for initial evaluation for treatment of obesity.    No past medical history on file.    Tobacco Use: Medium Risk (5/20/2024)    Patient History     Smoking Tobacco Use: Former     Smokeless Tobacco Use: Never     Passive Exposure: Past     Alcohol Use: Not on file     Bernard was diagnosed with NAHOMI in 2023. CPAP has been recommended though he hasn't acquired it yet. His NAHOMI is classified as mild to moderate. He has a history of depression and has been on lurasidone, lithium, and lamictal, as as well as clonazepam. The underlying diagnosis is uncertain, but may be bipolar disease. He has no other significant conditions. No major surgical history.     His father is 72 and obese. His mother is 70, overweight and has MASLD. His younger brother is morbidly obese. He is . His wife has obesity. He has a healthy 8-year-old daughter.     He is working with Dr. Wendy Sena at [facility] and has recently adopted a plant-based diet.   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 He wakes up around 6 and has a healthy breakfast, next eating at noon. He does little snacking during the day. He has dinner between 5 and 6PM and doesn't eat after that. He exercises on a treadmill or elliptical machine daily. He drinks only water. By adopting these healthy habits, he has lost about 10lbs over the past couple of months.     He feels well today, and is motivated to improve his overall health.       On physical examination       Body mass index is 42.99 kg/m².  Weight is 317lbs.   His pulmonary exam is normal. His heart exam reveals a grade II/VI systolic murmur, best heard along the LSB in the 3-4th interspaces. His abdominal exam is normal.   Overall Impression:  Pleasant young man with a high degree of obesity.   Goals included our Standard [employer] me goals with implementation counseling by Dr. Ramy Askew.  Will start and titrate metformin as he is on an atypical antipsychotic.  Followup in 4 months.     No food or drink after 7PM, Drink only water at all times., Have a protein-rich breakfast within 30 minutes of waking up., and Thirty minutes of continuous physical activity 7 days a week.

## 2024-08-21 ENCOUNTER — APPOINTMENT (OUTPATIENT)
Dept: INTEGRATIVE MEDICINE | Facility: CLINIC | Age: 34
End: 2024-08-21
Payer: COMMERCIAL

## 2024-08-21 VITALS — BODY MASS INDEX: 41.85 KG/M2 | WEIGHT: 309 LBS | HEIGHT: 72 IN

## 2024-08-21 DIAGNOSIS — G47.33 MODERATE OBSTRUCTIVE SLEEP APNEA: ICD-10-CM

## 2024-08-21 DIAGNOSIS — F39 MOOD DISORDER (CMS-HCC): ICD-10-CM

## 2024-08-21 DIAGNOSIS — E55.9 VITAMIN D DEFICIENCY: ICD-10-CM

## 2024-08-21 DIAGNOSIS — E53.8 VITAMIN B12 DEFICIENCY: ICD-10-CM

## 2024-08-21 PROCEDURE — 99215 OFFICE O/P EST HI 40 MIN: CPT | Performed by: INTERNAL MEDICINE

## 2024-08-21 PROCEDURE — 3008F BODY MASS INDEX DOCD: CPT | Performed by: INTERNAL MEDICINE

## 2024-08-21 RX ORDER — LUMATEPERONE 21 MG/1
21 CAPSULE ORAL DAILY
COMMUNITY

## 2024-08-21 ASSESSMENT — ENCOUNTER SYMPTOMS
FEVER: 0
HEADACHES: 0
APPETITE CHANGE: 0
DYSURIA: 0
MYALGIAS: 0
SLEEP DISTURBANCE: 0
SHORTNESS OF BREATH: 0
NERVOUS/ANXIOUS: 1
WEAKNESS: 0
FATIGUE: 0
DIFFICULTY URINATING: 0
BACK PAIN: 0
COUGH: 0
ARTHRALGIAS: 0

## 2024-08-21 NOTE — ASSESSMENT & PLAN NOTE
Improved. Continue care with psychiatry.   Encouraged daily exercise.   Continue to use cpap to optimize sleep   Reminded him to keep taking supplements. Recheck labs in 3 months.

## 2024-08-21 NOTE — PROGRESS NOTES
"Integrative Medicine Follow-up Visit :     Subjective   Patient ID: Rashid Reyes \"Wu" is a 34 y.o. male who presents for Obesity and Anxiety       HPI  Pt reports he got his cpap. He is sleeping with it most nights. Has only missed a few nights. Brought it when he travelled.  He thinks it helps it sleep. Noticed that he does not need to take naps during the day. Improvement felt in one week. Think it helps his mood.     Recently started on metformin because he is on an antipsychotic.  Some stomach discomfort after taking the full dose. Taking metformin 1000 mg PO Bid. Some loose stools.     He is following diet of Dr. Black. He feels that he was already doing 70-80% of the diet that Dr. Black recommends. He is eating breakfast. He is not drinking soda.   Still struggling to get enough exercise.  Hiking outside several nights per week. Last night went for a walk.     Doing noom now.  He had good experience with this in the past. 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 Has lost ten pounds so far.    Not taking vitamins regularly.     Still struggling with organization and working memory. Not taking b12 enough only couple days per week.     Pain:denies     Review of Systems   Constitutional:  Negative for appetite change, fatigue and fever.   HENT:  Negative for congestion and hearing loss.    Eyes:  Negative for visual disturbance.   Respiratory:  Negative for cough and shortness of breath.    Cardiovascular:  Negative for chest pain and leg swelling.   Genitourinary:  Negative for difficulty urinating, dysuria and urgency.   Musculoskeletal:  Negative for arthralgias, back pain and myalgias.   Skin:  Negative for rash.   Neurological:  Negative for weakness and headaches.   Psychiatric/Behavioral:  Negative for behavioral problems, sleep disturbance and suicidal ideas. The patient is nervous/anxious (depression).                   Objective   Ht 1.829 m (6')   Wt 140 kg (309 lb)   " BMI 41.91 kg/m²     Physical Exam  Constitutional:       General: He is not in acute distress.     Appearance: He is obese. He is not ill-appearing, toxic-appearing or diaphoretic.   Pulmonary:      Effort: Pulmonary effort is normal. No respiratory distress.   Musculoskeletal:         General: No deformity.      Cervical back: Normal range of motion.      Comments: Upper extremities normal range of motion grossly   Skin:     Coloration: Skin is not jaundiced or pale.      Findings: No rash.   Psychiatric:         Mood and Affect: Mood normal.         Behavior: Behavior normal.                      Assessment/Plan     Problem List Items Addressed This Visit             ICD-10-CM    Mood disorder (CMS-HCC) F39     Improved. Continue care with psychiatry.   Encouraged daily exercise.   Continue to use cpap to optimize sleep   Reminded him to keep taking supplements. Recheck labs in 3 months.          BMI 40.0-44.9, adult (Multi) - Primary Z68.41     Continue to focus on less processed foods. Continue care with Dr. Black  Discussed at length the role of exercise in weight maintenance and how important it could be for him to find a way to get some sort of movement daily. Also important for mood to exercise.   Agree with metformin right now given mood stabilizer.          Moderate obstructive sleep apnea G47.33     Encouraged use of cpap as he is doing so well now.   Try for slightly more sleep opportunity.          Vitamin B12 deficiency E53.8    Relevant Orders    Vitamin B12    Vitamin D deficiency E55.9    Relevant Orders    Vitamin D 25-Hydroxy,Total (for eval of Vitamin D levels)         Recommend Follow up in : 3 months.     Wendy Sena MD PhD    Time Spent  Prep time on day of patient encounter: 5 minutes  Time spent directly with patient, family or caregiver: 30 minutes  Additional Time Spent on Patient Care Activities: 0 minutes  Documentation Time: 5 minutes  Other Time Spent: 0 minutes  Total: 40  minutes

## 2024-08-21 NOTE — ASSESSMENT & PLAN NOTE
Continue to focus on less processed foods. Continue care with Dr. Black  Discussed at length the role of exercise in weight maintenance and how important it could be for him to find a way to get some sort of movement daily. Also important for mood to exercise.   Agree with metformin right now given mood stabilizer.

## 2024-08-21 NOTE — PATIENT INSTRUCTIONS
Continue recording the food choices in No[place].   Take your b12 daily. Let's recheck the level in 3 months.   Take the vitamin d 5000 International Units daily   Strive to walk or exercise for 30 minutes daily.

## 2024-10-29 NOTE — PROGRESS NOTES
"       [facility] Sleep [facility]  [facility]  [address]  [facility] [id number] [phone]     [facility]  Follow Up Visit Note          Subjective  Patient ID: Rashid Reyes \"Wu" is a 34 y.o. male with past medical history significant for Morbid Obesity, Elevated blood pressure reading,and OBSTRUCTIVE SLEEP APNEA.    11/6/24: UPDATED: The patient returned to the clinic to review his initial pap compliance. His ESS is  today.       4/3/2024: The patient is here alone today for a comprehensive sleep medicine evaluation due to sleep apnea and excessive daytime sleepiness/fatigue and to review his sleep study. He reports having sleep issues and daytime sleepiness. 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 His wife reports that he has been snoring at night and has gained a lot of weight in a few years. Today, he came to the clinic to establish care to treat his Obstructive sleep apnea. ESS: 12, HARRY: 10  today.     HPI  Patient had been having these symptoms for the past 1 years. Patient had Home Sleep Study in 2024 which showed NAHOMI but no CPAP started yet.        SLEEP STUDY HISTORY: (personally reviewed raw data such as interpretation report, data sheet, hypnogram, and titration table if available and applicable)  2/14/24: Home Sleep Study : REI3%: 15.2/hr, Supine REI3%: 21.5/hr, Shlomo: 85%, <88%: 2 minutes, consider 8-16 CWP     SLEEP-WAKE SCHEDULE  Bedtime: 11:30 PM on weekdays, MN on weekends  Subjective sleep latency: 10 minutes  Problems falling asleep: no  Number of awakenings: 0-1 times per night spontaneously for unknown reasons  Falls back asleep in 10 minutes  Problems staying asleep: no  Final wake time: 6:45 AM on weekdays, 9 AM on weekends  Out of bed time: 7 AM on weekdays, 9:30 AM on weekends  Naps: no  Average sleep duration (excluding naps): 7 hours     SLEEP " "ENVIRONMENT  Sleep location: bed  Sleep status: sleeps with wife  Room is dark:  Yes  Room is quiet: Yes  Room is cool: Yes  Bed comfort: good     SLEEP HABITS:   Activities before bedtime: watch TV  Activities in bed: no  Preferred sleep position: stomach     SLEEP ROS:  Night symptoms: POSITIVE for snoring, witnessed apnea, wake up gasping and/or choking for air, and nasal congestion   Morning symptoms: POSITIVE for unrefreshing sleep  Daytime symptoms POSITIVE for excessive daytime sleepiness, fatigue, and trouble remembering things in daytime  Hypersomnia / narcolepsy symptoms: Patient denies symptoms of a hypersomnolence disorder such as sleep paralysis, sleep-related hallucinations, recurrent sleep attacks, automatic behaviors, and cataplexy.   RLS symptoms: Patient denies RLS symptoms.  Movements in sleep: Patient denies problematic movements in sleep.  Parasomnia symptoms: POSITIVE for acting out dreams     WEIGHT: gained 10-15 lbs in 6 months      REVIEW OF SYSTEMS: All other systems have been reviewed and are negative.     PERTINENT SOCIAL HISTORY:  Occupation:  psychiatrist resdient  Smoking: No   ETOH: No   Marijuana: No   Caffeine: No  Sleep aids: Clonidine  Claustrophobia: No      PERTINENT PAST SURGICAL HISTORY:  non-contributory     PERTINENT FAMILY HISTORY:  Patient denies family history of any sleep disorder.     Active Problems, Allergy List, Medication List, and PMH/PSH/FH/Social Hx have been reviewed and reconciled in chart. No significant changes unless documented in the pertinent chart section. Updates made when necessary.       Objective        Physical Exam  Constitutional:alert and oriented to time, place, and person  Lungs: Clear to auscultation bilateral, no rales  Heart: Regular rate and rhythm, no murmurs        Assessment/Plan  Rashid Reyes \"Bernard\" is a 33 y.o. male who is seen to evaluate for moderate obstructive sleep apnea. The pathophysiology of sleep apnea, diagnostic " "testing (HST vs PSG), treatment options (PAP, oral appliance, surgery, hypoglossal nerve stimulator called Inspire), and supportive management (weight loss, positional therapy, smoking cessation, avoidance of alcohol and sedatives) were discussed with the patient in detail. Risk factors of sleep apnea as well as cardiometabolic and neurocognitive sequelae associated with untreated sleep apnea were also discussed. Lastly, patient was advised to avoid driving vehicle or operating heavy machinery when sleepy.      Rashid Reyes \"Bernard\" with the following problems:     # OBSTRUCTIVE SLEEP APNEA: REI3%: 15.2/hr, Supine REI3%: 21.5/hr  -[facility] anna Salazar provides a P30i mask in the clinic, he reports comfortable,   -Start 5-15  cmH20 auto CPAP through [employer] [facility].  -Sleep apnea and PAP therapy education were provided at length in the clinic today. Rashid Reyes \"Bernard\" verbalized understanding.  -Emphasized diet, exercise, and weight loss in the clinic, as were non-supine sleep, avoiding alcohol in the late evening, and driving or operating heavy machinery when sleepy.  -Rashid Reyes \"Bernard\"verbalizes understanding of the above instructions and risks.     # DEPRESSION :  -Rashid Reyes \"Bernard\"is taking Lithium and participating in psychotherapy.  -Denies HI/SI     # MORBID OBESITY:  -with a BMI of 44.35. Rashid Reyes \"Bernard\" most recent Bicarbonate was 26 on 11/27/23        Bicarbonate   Date Value Ref Range Status   11/27/2023 26 21 - 32 mmol/L Final   -Encourage to have regular exercise to manage weight well.  -Refer to see a nutritionist      # NASAL CONGESTION:  -Instruct Rashid Reyes \"Bernard\"to use appropriate Flonase spray to ease congestion.  -Refer to Otolaryngology for underlying investigation.     RTC 1 month after receiving pap        All of patient's questions were answered. He verbalizes understanding and agreement with my assessment and plan.  "

## 2024-11-06 ENCOUNTER — APPOINTMENT (OUTPATIENT)
Dept: SLEEP MEDICINE | Facility: HOSPITAL | Age: 34
End: 2024-11-06
Payer: COMMERCIAL

## 2024-11-20 ENCOUNTER — APPOINTMENT (OUTPATIENT)
Dept: INTEGRATIVE MEDICINE | Facility: CLINIC | Age: 34
End: 2024-11-20
Payer: COMMERCIAL

## 2024-11-20 ENCOUNTER — TELEMEDICINE (OUTPATIENT)
Dept: INTEGRATIVE MEDICINE | Facility: CLINIC | Age: 34
End: 2024-11-20
Payer: COMMERCIAL

## 2024-11-20 VITALS — WEIGHT: 296.6 LBS | BODY MASS INDEX: 40.23 KG/M2

## 2024-11-20 DIAGNOSIS — F39 MOOD DISORDER (CMS-HCC): Primary | ICD-10-CM

## 2024-11-20 DIAGNOSIS — E55.9 VITAMIN D DEFICIENCY: ICD-10-CM

## 2024-11-20 DIAGNOSIS — E53.8 VITAMIN B12 DEFICIENCY: ICD-10-CM

## 2024-11-20 PROCEDURE — 99214 OFFICE O/P EST MOD 30 MIN: CPT | Performed by: INTERNAL MEDICINE

## 2024-11-20 ASSESSMENT — ENCOUNTER SYMPTOMS
SHORTNESS OF BREATH: 0
BACK PAIN: 0
FEVER: 0
ARTHRALGIAS: 0
WEAKNESS: 0
MYALGIAS: 0
APPETITE CHANGE: 0
SLEEP DISTURBANCE: 0
DIFFICULTY URINATING: 0
FATIGUE: 0
COUGH: 0
NERVOUS/ANXIOUS: 1
DYSURIA: 0
HEADACHES: 0

## 2024-11-20 NOTE — ASSESSMENT & PLAN NOTE
Get labs.  Thinks he may have mthfr. Sometimes high dose deplen can cause insomnia. Would rather he eat plenty of high folate foods. Most americans do not eat enough folat.e

## 2024-11-20 NOTE — PROGRESS NOTES
"Integrative Medicine Follow-up Visit :     Subjective   Patient ID: Rashid Reyes \"Wu" is a 34 y.o. male who presents for No chief complaint on file.       HPI  Still losing weight. He has decreased the meformin to 500 mg twice per day.   Carlat report recommended he take thiamine and b6.  He also found that some of the meds he is taking reduce b6 levels.   Did not get his labs. Continues to take the vitamin b12 and vitamin D.   Trouble sleeping. Take deplen. Started deplen.       Not exercising. He is picking up a lot of extra housework. Wife is pregnant. He does not have much time to work out. He wants to get back into it. Started taking some walks for a couple weeks but did not keep doing it. Daughter loves to ride her bike.     Also taking NAC 1 gram per day twice daily. Thinks it helps his mood. Mood a bit better. Better energy.       Review of Systems   Constitutional:  Negative for appetite change, fatigue and fever.   HENT:  Negative for congestion and hearing loss.    Eyes:  Negative for visual disturbance.   Respiratory:  Negative for cough and shortness of breath.    Cardiovascular:  Negative for chest pain and leg swelling.   Genitourinary:  Negative for difficulty urinating, dysuria and urgency.   Musculoskeletal:  Negative for arthralgias, back pain and myalgias.   Skin:  Negative for rash.   Neurological:  Negative for weakness and headaches.   Psychiatric/Behavioral:  Negative for behavioral problems, sleep disturbance and suicidal ideas. The patient is nervous/anxious (depression).                   Objective   Wt 135 kg (296 lb 9.6 oz)   BMI 40.23 kg/m²     Physical Exam  Constitutional:       General: He is not in acute distress.     Appearance: He is obese. He is not ill-appearing, toxic-appearing or diaphoretic.   Pulmonary:      Effort: Pulmonary effort is normal. No respiratory distress.   Musculoskeletal:         General: No deformity.      Cervical back: Normal range of motion.      " Comments: Upper extremities normal range of motion grossly   Skin:     Coloration: Skin is not jaundiced or pale.      Findings: No rash.   Psychiatric:         Mood and Affect: Mood normal.         Behavior: Behavior normal.                      Assessment/Plan     Problem List Items Addressed This Visit             ICD-10-CM    Mood disorder (CMS-HCC) - Primary F39    Relevant Orders    Vitamin B6    Vitamin B1, Whole Blood    Folate    Homocysteine, serum    BMI 40.0-44.9, adult (Multi) Z68.41     Continue working on weight loss with Dr. Black.   Restart exercise to help maintain weight lsos.          Vitamin B12 deficiency E53.8     Get labs.  Thinks he may have mthfr. Sometimes high dose deplen can cause insomnia. Would rather he eat plenty of high folate foods. Most americans do not eat enough folat.e         Vitamin D deficiency E55.9     Needst o check labs.               Recommend Follow up in : 3 months.     Wendy Sena MD PhD

## 2024-11-20 NOTE — PATIENT INSTRUCTIONS
Switch fish oil to nordic naturels algae omega 3 fatty acid.   This is your brain on food by Dr. Erazo. Or listen to the podcast by Dylan.   Start to eat spinach every day.   4. Please get your labs.     Follow up 3 months.   Wendy Sena MD PhD

## 2024-12-11 ENCOUNTER — APPOINTMENT (OUTPATIENT)
Dept: DERMATOLOGY | Facility: CLINIC | Age: 34
End: 2024-12-11
Payer: COMMERCIAL

## 2024-12-30 NOTE — PROGRESS NOTES
"       [facility] Sleep Medicine  [facility]  [address]  [facility] [id number] [phone]     [facility] Medicine Clinic  Follow Up Visit Note       Subjective  Patient ID: Rashid Reyes \"Bernard\" is a 34 y.o. male with past medical history significant for Morbid Obesity, Elevated blood pressure reading, and OBSTRUCTIVE SLEEP APNEA.     1/8/25: UPDATED : The patient returned to the clinic to review his initial pap compliance.  He reported that he lost 30 lbs in 6 months. After starting the pap, he is improving his sleep quality; he has no snoring and has witnessed sleep apnea. His over 4 hours pap compliance rate was 87  %, His ESS  is 7, and his HARRY  is 12  today.     4/3/2024: The patient is here alone today for a comprehensive sleep medicine evaluation due to sleep apnea and excessive daytime sleepiness/fatigue and to review his sleep study. He reports having sleep issues and daytime sleepiness. 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 His wife reports that he has been snoring at night and has gained a lot of weight in a few years. Today, he came to the clinic to establish care to treat his Obstructive sleep apnea. ESS: 12, HARRY: 10  today.     HPI  Patient had been having these symptoms for the past 1 years. Patient had Home Sleep Study in 2024 which showed NAHOMI but no CPAP started yet.        SLEEP STUDY HISTORY: (personally reviewed raw data such as interpretation report, data sheet, hypnogram, and titration table if available and applicable)  2/14/24: Home Sleep Study : REI3%: 15.2/hr, Supine REI3%: 21.5/hr, Shlomo: 85%, <88%: 2 minutes, consider 8-16 CWP     SLEEP-WAKE SCHEDULE  Bedtime: 11:30 PM on weekdays, MN on weekends  Subjective sleep latency: 10 minutes  Problems falling asleep: no  Number of awakenings: 0-1 times per night spontaneously for unknown reasons  Falls back asleep in 10 " minutes  Problems staying asleep: no  Final wake time: 6:45 AM on weekdays, 9 AM on weekends  Out of bed time: 7 AM on weekdays, 9:30 AM on weekends  Naps: no  Average sleep duration (excluding naps): 7 hours     SLEEP ENVIRONMENT  Sleep location: bed  Sleep status: sleeps with wife  Room is dark:  Yes  Room is quiet: Yes  Room is cool: Yes  Bed comfort: good     SLEEP HABITS:   Activities before bedtime: watch TV  Activities in bed: no  Preferred sleep position: stomach     SLEEP ROS: (after cpap)  Night symptoms: Denies for snoring, witnessed apnea, wake up gasping and/or choking for air, and nasal congestion   Morning symptoms: Denies for unrefreshing sleep  Daytime symptoms Denies for excessive daytime sleepiness, fatigue, and trouble remembering things in daytime  Hypersomnia / narcolepsy symptoms: Patient denies symptoms of a hypersomnolence disorder such as sleep paralysis, sleep-related hallucinations, recurrent sleep attacks, automatic behaviors, and cataplexy.   RLS symptoms: Patient denies RLS symptoms.  Movements in sleep: Patient denies problematic movements in sleep.  Parasomnia symptoms: POSITIVE for acting out dreams     WEIGHT: gained 10-15 lbs in 6 months      REVIEW OF SYSTEMS: All other systems have been reviewed and are negative.     PERTINENT SOCIAL HISTORY:  Occupation:  psychiatrist resdient  Smoking: No   ETOH: No   Marijuana: No   Caffeine: No  Sleep aids: Clonidine  Claustrophobia: No      PERTINENT PAST SURGICAL HISTORY:  non-contributory     PERTINENT FAMILY HISTORY:  Patient denies family history of any sleep disorder.     Active Problems, Allergy List, Medication List, and PMH/PSH/FH/Social Hx have been reviewed and reconciled in chart. No significant changes unless documented in the pertinent chart section. Updates made when necessary.          Objective     Vitals:    01/08/25 0854   BP: 119/83   Pulse: 74   Temp: 36.3 °C (97.4 °F)   SpO2: 96%         Physical  "Exam  Constitutional:alert and oriented to time, place, and person  Lungs: Clear to auscultation bilateral, no rales  Heart: Regular rate and rhythm, no murmurs    PAP Adherence:  DURABLE MEDICAL EQUIPMENT COMPANY: [employer]  Machine: THERAPY: RESMED AIRSENSE 11 PRESSURE SETTINGS: 5 - 15 cm H2O  Mask: P10 Large   Issues with therapy: ISSUES WITH THERAPY: denies  Benefits with PAP: PERCEIVED BENEFITS OF PAP: refreshing sleep reduced daytime sleepiness decreased or no snoring better sleep quality decreased frequency of dry mouth reduced nasal congestion    A PAP adherence download was obtained and data was reviewed personally today in clinic. (see scanned document in EPIC)           Assessment/Plan  Rashid Otttonjamadison \"Bernard\" is a 34y.o. male who is seen to evaluate for moderate obstructive sleep apnea. Risk factors of sleep apnea as well as cardiometabolic and neurocognitive sequelae associated with untreated sleep apnea were also discussed. Lastly, patient was advised to avoid driving vehicle or operating heavy machinery when sleepy.      Rashid GOVEA Eric \"Bernard\" with the following problems:     # OBSTRUCTIVE SLEEP APNEA: REI3%: 15.2/hr, Supine REI3%: 21.5/hr  -Great compliance, continue 5-15  cmH20 auto CPAP and renew annual supplies through [facility].  -Sleep apnea and PAP therapy education were provided at length in the clinic today. Rashid GOVEA Eric Bynum"Bernard\" verbalized understanding.  -Emphasized diet, exercise, and weight loss in the clinic, as were non-supine sleep, avoiding alcohol in the late evening, and driving or operating heavy machinery when sleepy.  -Rashid Reyes \"Bernard\"verbalizes understanding of the above instructions and risks.     # DEPRESSION :  -Rashid XUAN Bynum"Bernard\"is taking Lithium and participating in psychotherapy.  -Denies HI/SI     # MORBID OBESITY:  -with a BMI of 40.28. Rashid Reyes \"Bernard\" most recent Bicarbonate was 26 on 11/27/23      " "  Bicarbonate   Date Value Ref Range Status   11/27/2023 26 21 - 32 mmol/L Final   -Encourage to have regular exercise to manage weight well.  -Refer to see a nutritionist      # NASAL CONGESTION:  -Instruct Rashid Reyes \"Bernard\"to use appropriate Flonase spray to ease congestion.  -Feel better     RTC  6 months     All of patient's questions were answered. He verbalizes understanding and agreement with my assessment and plan.  "

## 2025-01-08 ENCOUNTER — HOSPITAL ENCOUNTER (OUTPATIENT)
Dept: RADIOLOGY | Facility: HOSPITAL | Age: 35
Discharge: HOME | End: 2025-01-08
Payer: COMMERCIAL

## 2025-01-08 ENCOUNTER — LAB (OUTPATIENT)
Dept: LAB | Facility: LAB | Age: 35
End: 2025-01-08
Payer: COMMERCIAL

## 2025-01-08 ENCOUNTER — OFFICE VISIT (OUTPATIENT)
Dept: SLEEP MEDICINE | Facility: HOSPITAL | Age: 35
End: 2025-01-08
Payer: COMMERCIAL

## 2025-01-08 VITALS
OXYGEN SATURATION: 96 % | HEART RATE: 74 BPM | BODY MASS INDEX: 40.28 KG/M2 | DIASTOLIC BLOOD PRESSURE: 83 MMHG | TEMPERATURE: 97.4 F | SYSTOLIC BLOOD PRESSURE: 119 MMHG | WEIGHT: 297 LBS

## 2025-01-08 DIAGNOSIS — E55.9 VITAMIN D DEFICIENCY: ICD-10-CM

## 2025-01-08 DIAGNOSIS — M76.821 POSTERIOR TIBIAL TENDON DYSFUNCTION (PTTD) OF BOTH LOWER EXTREMITIES: ICD-10-CM

## 2025-01-08 DIAGNOSIS — M21.42 PES PLANUS OF BOTH FEET: ICD-10-CM

## 2025-01-08 DIAGNOSIS — M76.822 POSTERIOR TIBIAL TENDON DYSFUNCTION (PTTD) OF BOTH LOWER EXTREMITIES: ICD-10-CM

## 2025-01-08 DIAGNOSIS — G47.33 MODERATE OBSTRUCTIVE SLEEP APNEA: ICD-10-CM

## 2025-01-08 DIAGNOSIS — F39 MOOD DISORDER (CMS-HCC): ICD-10-CM

## 2025-01-08 DIAGNOSIS — G47.33 OBSTRUCTIVE SLEEP APNEA (ADULT) (PEDIATRIC): Primary | ICD-10-CM

## 2025-01-08 DIAGNOSIS — R03.0 ELEVATED BLOOD PRESSURE READING WITHOUT DIAGNOSIS OF HYPERTENSION: ICD-10-CM

## 2025-01-08 DIAGNOSIS — J31.0 CHRONIC RHINITIS: ICD-10-CM

## 2025-01-08 DIAGNOSIS — M21.41 PES PLANUS OF BOTH FEET: ICD-10-CM

## 2025-01-08 DIAGNOSIS — E53.8 VITAMIN B12 DEFICIENCY: ICD-10-CM

## 2025-01-08 LAB
25(OH)D3 SERPL-MCNC: 27 NG/ML (ref 30–100)
FOLATE SERPL-MCNC: >24 NG/ML
HCYS SERPL-SCNC: 5.81 UMOL/L (ref 5–13.9)
VIT B12 SERPL-MCNC: 1042 PG/ML (ref 211–911)

## 2025-01-08 PROCEDURE — 84425 ASSAY OF VITAMIN B-1: CPT

## 2025-01-08 PROCEDURE — 82607 VITAMIN B-12: CPT

## 2025-01-08 PROCEDURE — 99213 OFFICE O/P EST LOW 20 MIN: CPT

## 2025-01-08 PROCEDURE — 73630 X-RAY EXAM OF FOOT: CPT | Mod: RIGHT SIDE | Performed by: RADIOLOGY

## 2025-01-08 PROCEDURE — 73630 X-RAY EXAM OF FOOT: CPT | Mod: LEFT SIDE | Performed by: RADIOLOGY

## 2025-01-08 PROCEDURE — 82306 VITAMIN D 25 HYDROXY: CPT

## 2025-01-08 PROCEDURE — 73630 X-RAY EXAM OF FOOT: CPT | Mod: LT

## 2025-01-08 PROCEDURE — 36415 COLL VENOUS BLD VENIPUNCTURE: CPT

## 2025-01-08 PROCEDURE — 84207 ASSAY OF VITAMIN B-6: CPT

## 2025-01-08 PROCEDURE — 1036F TOBACCO NON-USER: CPT

## 2025-01-08 PROCEDURE — 82746 ASSAY OF FOLIC ACID SERUM: CPT

## 2025-01-08 PROCEDURE — 73630 X-RAY EXAM OF FOOT: CPT | Mod: RT

## 2025-01-08 PROCEDURE — 83090 ASSAY OF HOMOCYSTEINE: CPT

## 2025-01-08 ASSESSMENT — SLEEP AND FATIGUE QUESTIONNAIRES
HOW LIKELY ARE YOU TO NOD OFF OR FALL ASLEEP IN A CAR, WHILE STOPPED FOR A FEW MINUTES IN TRAFFIC: SLIGHT CHANCE OF DOZING
SATISFACTION_WITH_CURRENT_SLEEP_PATTERN: SATISFIED
DIFFICULTY_FALLING_ASLEEP: MODERATE
HOW LIKELY ARE YOU TO NOD OFF OR FALL ASLEEP WHILE SITTING AND READING: SLIGHT CHANCE OF DOZING
HOW LIKELY ARE YOU TO NOD OFF OR FALL ASLEEP WHILE SITTING AND TALKING TO SOMEONE: WOULD NEVER DOZE
ESS-CHAD TOTAL SCORE: 7
HOW LIKELY ARE YOU TO NOD OFF OR FALL ASLEEP WHILE SITTING QUIETLY AFTER LUNCH WITHOUT ALCOHOL: SLIGHT CHANCE OF DOZING
WORRIED_DISTRESSED_DUE_TO_SLEEP: A LITTLE
HOW LIKELY ARE YOU TO NOD OFF OR FALL ASLEEP WHILE WATCHING TV: SLIGHT CHANCE OF DOZING
HOW LIKELY ARE YOU TO NOD OFF OR FALL ASLEEP WHILE LYING DOWN TO REST IN THE AFTERNOON WHEN CIRCUMSTANCES PERMIT: WOULD NEVER DOZE
SITING INACTIVE IN A PUBLIC PLACE LIKE A CLASS ROOM OR A MOVIE THEATER: MODERATE CHANCE OF DOZING
DIFFICULTY_STAYING_ASLEEP: MILD
HOW LIKELY ARE YOU TO NOD OFF OR FALL ASLEEP WHEN YOU ARE A PASSENGER IN A CAR FOR AN HOUR WITHOUT A BREAK: SLIGHT CHANCE OF DOZING
SLEEP_PROBLEM_NOTICEABLE_TO_OTHERS: MUCH
SLEEP_PROBLEM_INTERFERES_DAILY_ACTIVITIES: MUCH

## 2025-01-08 ASSESSMENT — PAIN SCALES - GENERAL: PAINLEVEL_OUTOF10: 0-NO PAIN

## 2025-01-08 NOTE — PATIENT INSTRUCTIONS
[facility] Sleep Medicine  [facility]  [address]  [facility] [id number] [phone]       Thank you for coming to the Sleep Medicine Clinic today! Your sleep medicine provider today was: TANA Charles Below is a summary of your treatment plan, patient education, other important information, and our contact numbers.    Dear Mr. Rashid Reyes       Your Sleep Provider Today: TANA Charles  Your Primary Care Physician: Julien Mejia, DO   Your Referring Provider: Roc Kohli APRN-CNP    Diagnosis: OBSTRUCTIVE SLEEP APNEA       Thank you for visiting  Sleep Medicine Clinic !     1.You are doing great, we ordered the annual supplies for you. Feel free to contact your DME company to get new supplies.    2. Please do not drive when you are sleepy and continue practicing the sleep hygiene as discussed in clinic.    3. FOR QUESTIONS AND CONCERNS:   a) : In case of problems with machine or mask interface, please contact your DME company first. DME is the company who provides you the machine and/or PAP supplies / accessories. If [employer] is your DME, you can reach them at [phone].   b): Please call my office with issues or questions: [phone] ([place]); [phone] ([facility]); [phone] ([place])    If you have a CPAP or BiPAP machine at home, please bring the unit and all accessories including the power cord to your appointments unless I tell you otherwise. Please have knowledge of the DME company you worked with to receive your PAP device. If you have copies of any previous sleep testing completed outside of , please bring with you to clinic as well. This information will make our visits more productive.     If you are new to CPAP or BiPAP, please note the minimum usage insurance requires to continuing coverage for the equipment as noted by your DME company. Please discuss equipment issues  "(PAP unit, mask fit, humidification, etc.) with your [employer] company first.       In the event that you are running more than 10 minutes late to your appointment, I will kindly ask you to reschedule. Thanks.      TREATMENT PLAN     - Continue current machine settings. Continue using machine every night all night.   - Renew PAP supplies. Order placed and sent to [employer].  - Please read the \"Patient Education\" section below for more detailed information. Try implementing tips, reminders, strategies, and supportive management.   - If not yet done, please sign up for [employer] to make a future schedule, send prescription requests, or send messages.    Follow-up Appointment:   Follow-up in 6 months.    PATIENT EDUCATION     OBSTRUCTIVE SLEEP APNEA (NAHOMI) is a sleep disorder where your upper airway muscles relax during sleep and the airway intermittently and repetitively narrows and collapses leading to partially blocked airway (hypopnea) or completely blocked airway (apnea) which, in turn, can disrupt breathing in sleep, lower oxygen levels while you sleep and cause night time wakings. Because both apnea and hypopnea may cause higher carbon dioxide or low oxygen levels, untreated NAHOMI can lead to heart arrhythmia, elevation of blood pressure, and make it harder for the body to consolidate memory and facilitate metabolism (leading to higher blood sugars at night). Frequent partial arousals occur during sleep resulting in sleep deprivation and daytime sleepiness. NAHOMI is associated with an increased risk of cardiovascular disease, stroke, hypertension, and insulin resistance. Moreover, untreated NAHOMI with excessive daytime sleepiness can increase the risk of motor vehicular accidents.    Below are conservative strategies for NAHOMI regardless of NAHOMI severity are:   Positional therapy - Avoid sleeping on your back.   Healthy diet and regular exercise to optimize weight is highly encouraged.   Avoid alcohol late in the " evening and sedative-hypnotics as these substances can make sleep apnea worse.   Improve breathing through the nose with intranasal steroid spray, saline rinse, or antihistamines    Safety: Avoid driving vehicle and operating heavy equipment while sleepy. Drowsy driving may lead to life-threatening motor vehicle accidents. A person driving while sleepy is 5 times more likely to have an accident. If you feel sleepy, pull over and take a short power nap (sleep for less than 30 minutes). Otherwise, ask somebody to drive you.    Treatment options for sleep apnea include weight management, positional therapy, Positive Airway Therapy (PAP) therapy, oral appliance therapy, hypoglossal nerve stimulator (Inspire) and select airway surgeries.    Starting Positive Airway Pressure (PAP): You were ordered a device to wear when you sleep called PAP (Positive Airway Pressure) to treat your sleep apnea. The order will be submitted to a durable medical equipment (DME) company who will arrange setting you up with the device. They will provide all the necessary equipment and discuss use and maintenance of the device with you as well as mask fitting and process of replacing / renewing PAP supplies or accessories. Once you get the machine, please start using it immediately. You may not be successful right away and that is okay. [place] be certain that you keep trying nightly and reach out to DME if you are struggling or having issues with machine usage.     *Please follow-up with me in 1-2 months of starting CPAP to see how well it is working for you and to do some troubleshooting if needed. Also, please bring all PAP equipment with you to follow up appointments unless told otherwise.     Important things to keep in mind as you start PAP:  Insurance will monitor your usage during the first 90 days. You should use your PAP - all night, every night, and including all naps (especially if naps are more than 30 minutes) for your health. The  bare minimum is to use your PAP device while sleeping for at least 4 hours per day at least 5-6 days per week.. Otherwise, your PAP device will be reclaimed by your DME company at 90 days.  There are many masks to choose from to wear with your PAP machine. If you are not comfortable with the first mask issued to you, call your DME company and ask for another option to try. You typically have a 30-day mask guarantee from the day you received your machine.   Discuss with your provider if you are having issues breathing with the machine or if the temperature or humidity feel uncomfortable.  Expect to have an adjustment period when you start your device. It helps to continuing wearing the machine every day for a period of time until you get more used to it. You can practice with wearing the mask alone if you need, then add in the PAP air pressure a few days later.   Reach out for help if you are struggling! The sleep medicine department can be reached at [phone]  We encourage you to download data monitoring apps to your phone. For GeeYee 10/11 - Media[employer] senia. For [employer]CAT - DreamMapper. Both apps are available in the [employer] store for free and are a great tool to monitor your progress with your PAP device night to night.    Tips for success with PAP machine usage:  Comfortable and well-fitting mask  Appropriate pressure on the machine  Using humidification  Support from bed partner and clinical team      Maintaining your CPAP/BPAP device:    The humidification chamber (aka water tank or water chamber) needs to be filled with distilled water to prevent buildup of white deposits in the future. If you cannot find distilled water, you can use tap water but expect to have white deposits buildup seen after prolonged use with tap water. If you start seeing white deposits on the water chamber, you can clean it by filling it with equal parts of distilled white vinegar and water. Let the vinegar-water  mixture sit for 2 hours, and then rinse it with running tap water. Clean with soap and water then let it dry.     You should try to keep your machine clean in order to work well. Here are some tips to clean PAP supplies / accessories:    Clean the humidification chamber (aka water tank) as well as your mask and tubing at least once a week with soap and water.   Alternatively, you can fill a sink or basin with warm water and add a little mild detergent, like Ivory dish soap. Gently wipe your supplies with the soapy water to free all the oils and dirt that may have collected. Once that's done, rinse these items with clean water until the soap is gone and let them air dry. You can hang your tubing over the curtain meri in your bathroom so that it dries.  The mask insert (part of the mask that has contact with your skin) needs to be cleaned with soap and water daily. Another option is to wipe them down with CPAP wipes or baby wipes.    You should replace your mask and tubing frequently in order to prevent bacteria buildup, machine damage, and mask seal issues. The older the mask and hose, the high likelihood that there is bacteria buildup in it especially if they are not cleaned regularly. Dirty filters damage machines because build-up of dust and contaminants can cause machine to over-heat, and in time, damage the motor of machine. Cushions lose their seal over time as most masks are made of plastic and silicone while headgear is made of neoprene. These materials will break down with age and frequent use. Here is the recommended replacement schedule for PAP supplies / accessories:    Twice a month- disposable filters and cushions for nasal mask or nasal pillows.  Once a month- cushion for full face mask  Every 3 months- mask with headgear and PAP tubing (standard or heated hose)  Every 6 months- reusable filter, water chamber, and chin strap     Other useful information:    Some people do not put water in the tank while  other people prefers to put water in the tank to prevent mouth dryness. Try to experiment to determine which is more comfortable for you.   In general, new machines have 2 years warranty on parts while health insurance allows you to have a new machine once every 5 years.     Common issues with PAP machine:    Mask gets dislodged when turning to the side: Consider getting a CPAP pillow or switching to a mask with hose on top.     Dry mouth:  Your machine has built-in humidifier that heats up the air to prevent dry mouth. It can be adjusted to your comfort. You can try that first and increase setting one level one night at a time to check which setting is comfortable and effective in lessening dry mouth. In some patients with heated hose, adjusting tube temperature to make air warmer can improve dry mouth. If dry mouth persists despite adjusting humidity or tube temperature setting, may apply OTC Biotene gel over the gums at bedtime.  If Biotene gel is not effective, consider trying XEROSTOM gel from [employer].  Also, eliminate or reduce dose of medications that can cause dry mouth if possible. Lastly, may try getting a separate room humidifier machine.    Airleaks: Please call DME as they may need to adjust your mask or refit you with a different kind or different size of mask. In addition, you can ask DME for tips on getting a good mask seal and mask fit.     Difficulty tolerating the mask: Contact your DME to try a different kind of mask and/or call office to get a referral to Sleep Psychologist for CPAP desensitization. CPAP desensitization technique is a set of strategies that helps patient cope with claustrophobia and anxiety related to wearing mask. Alternatively, we can do a daytime mini-sleep study called PAP-nap trial wherein you will try on different kinds of mask and the sleep technician will try different pressure settings on CPAP and BPAP machines to see which specific pressure is tolerable and  "comfortable for you.     Water droplets or moisture within the hose and/or mask: This is called rain-out and it is caused by condensation of too much heated humidity on the cooler walls of the hose. If you have rain-out, turn down humidity settings or get a heated hose. If you already have a heated hose, turn up the \"tube temperature\" of the heated hose. Alternatively, if you don't want to get a heated hose or warmer air, may wrap the CPAP hose with stockings to keep it somewhat warm. Also, you need to place the machine on the floor and lower the hose so that water won't travel upward towards your mask.     You can also go to the following EDUCATION WEBSITES for further information:   American Academy of Sleep Medicine http://sleepeducation.org  National Sleep Foundation: https://sleepfoundation.org  American Sleep Apnea Association: https://www.sleepapnea.org (for patients with sleep apnea)  Narcolepsy Network: https://www.narcolepsynetwork.org (for patients with narcolepsy)  [employer]: https://www.wakeupnarcolepsy.org (for patients with narcolepsy)  Hypersomnia Foundation: https://www.hypersomniafoundation.org (for patients with idiopathic hypersomnia)  RLS foundation: https://www.rls.org (for patients with restless leg syndrome)    IMPORTANT INFORMATION     Call 911 for medical emergencies.  Our offices are generally open from Monday-Friday, 8 am - 5 pm.   There are no supporting services by either the sleep doctors or their staff on weekends and Holidays, or after 5 PM on weekdays.   If you need to get in touch with me, you may either call my office number or you can use [employer].  If a referral for a test, for CPAP, or for another specialist was made, and you have not heard about scheduling this within a week, please call scheduling at [phone]).  If you are unable to make your appointment for clinic or an overnight study, kindly call the office or sleep testing center at least 48 hours in " advance to cancel and reschedule.  If you are on CPAP, please bring your device's card and/or the device to each clinic appointment.   In case of problems with PAP machine or mask interface, please contact your DME (Durable Medical Equipment) company first. DME is the company who provides you the machine and/or PAP supplies.       PRESCRIPTIONS     We require 7 days advanced notice for prescription refills. If we do not receive the request in this time, we cannot guarantee that your medication will be refilled in time.    IMPORTANT PHONE NUMBERS     Sleep Medicine Clinic Fax: [phone]  Appointments (for Pediatric Sleep Clinic): [phone] - option 1  Appointments (for Adult Sleep Clinic): [phone] - option 2  Appointments (For Sleep Studies): [phone] - option 3  Behavioral Sleep Medicine: [phone]  Sleep Surgery: [phone]  Nutrition Service: [phone]  Weight management clinics with endocrinology: [phone]  Bariatric Services: [phone] (includes weight loss medications and weight loss surgery)  [facility]: [phone] (offers holistic approaches to weight management)  ENT (Otolaryngology): [phone]  Headache Clinic (Neurology): [phone]  Neurology: [phone]  Psychiatry: [phone]  Pulmonary Function Testing (PFT) Center: [phone]  Pulmonary Medicine: [phone]  [employer] [facility]): [phone]      OUR SLEEP TESTING LOCATIONS     Our team will contact you to schedule your sleep study, however, you can contact us as follow:  Main Phone Line (scheduling only): [phone]), option 3  Adult and Pediatric Locations  [facility] (6 years and older): [facility] - 4th floor ([address] [place] [address]) After hours line: [phone]  [facility] (Main campus: All ages): [facility], 6th floor. After hours line: [phone]  [facility] (5 years and  "older; younger considered on case-by-case basis): [address]; [address], [address]. Scheduling  After hours line: [phone]   [place] (6 years and older): [address]; [address]; [address]   [place] (6 years and older): [address] [facility], [address]  After hours line: [phone]   [place] (13 years and older) in [place]: [address], 2nd floor  After hours line: [phone]   [place] (13 year and older): [address]  After hours line: [phone]     Adult Only Locations:   [place] (18 years and older): [address] [facility], 2nd floor   [place] (18 years and older): [address] [facility]; 4th floor  After hours line: [phone]  [facility] (18 years and older) at [place]: [id number] [place] [address]  After hours line: [phone]     CONTACTING YOUR SLEEP MEDICINE PROVIDER AND SLEEP TEAM      For issues with your machine or mask interface, please call your DME provider first. DME stands for durable medical company. DME is the company who provides you the machine and/or PAP supplies / accessories.   To schedule, cancel, or reschedule SLEEP STUDY APPOINTMENTS, please call the Main Phone Line at [phone] - option 3.   To schedule, cancel, or reschedule CLINIC APPOINTMENTS, you can do it in \"[place]\", call [phone] to speak with my  (Kathia Solis), or call the Main Phone Line at [phone] - option 2  For CLINICAL QUESTIONS or MEDICATION REFILLS, please call direct line for Adult Sleep Nurses at [phone].   Lastly, you can also send a message directly to your provider through \"My Chart\", which is the email service through your  Records Account: [email or web address]       Here at [facility], we wish you a restful sleep!   "

## 2025-01-09 ENCOUNTER — PATIENT MESSAGE (OUTPATIENT)
Dept: INTEGRATIVE MEDICINE | Facility: CLINIC | Age: 35
End: 2025-01-09
Payer: COMMERCIAL

## 2025-01-09 DIAGNOSIS — E55.9 VITAMIN D DEFICIENCY: Primary | ICD-10-CM

## 2025-01-11 LAB — VIT B1 PYROPHOSHATE BLD-SCNC: 230 NMOL/L (ref 70–180)

## 2025-01-12 LAB — PYRIDOXAL PHOS SERPL-SCNC: 151.8 NMOL/L (ref 20–125)

## 2025-01-13 ENCOUNTER — PATIENT MESSAGE (OUTPATIENT)
Dept: INTEGRATIVE MEDICINE | Facility: CLINIC | Age: 35
End: 2025-01-13
Payer: COMMERCIAL

## 2025-01-30 ENCOUNTER — APPOINTMENT (OUTPATIENT)
Dept: PODIATRY | Facility: CLINIC | Age: 35
End: 2025-01-30
Payer: COMMERCIAL

## 2025-01-30 DIAGNOSIS — M72.2 PLANTAR FASCIITIS: ICD-10-CM

## 2025-01-30 DIAGNOSIS — M21.41 PES PLANUS OF BOTH FEET: Primary | ICD-10-CM

## 2025-01-30 DIAGNOSIS — L85.3 XEROSIS CUTIS: ICD-10-CM

## 2025-01-30 DIAGNOSIS — M21.42 PES PLANUS OF BOTH FEET: Primary | ICD-10-CM

## 2025-01-30 PROCEDURE — 99203 OFFICE O/P NEW LOW 30 MIN: CPT | Performed by: PODIATRIST

## 2025-01-30 RX ORDER — UREA 40 %
CREAM (GRAM) TOPICAL
Qty: 85 G | Refills: 3 | Status: SHIPPED | OUTPATIENT
Start: 2025-01-30 | End: 2025-03-31

## 2025-01-30 RX ORDER — METHYLPHENIDATE HYDROCHLORIDE 10 MG/1
10 CAPSULE, EXTENDED RELEASE ORAL EVERY MORNING
COMMUNITY
Start: 2025-01-24

## 2025-01-30 NOTE — PROGRESS NOTES
"History Of Present Illness  Rashid Reyes \"Bernard\" is a 34 y.o. male presenting with chief complaint of: requesting orthotics, patient previously seen Yola Holt DPM  Patient has bilateral foot pain.  He states that he has discomfort when standing for long periods of time.  He is using an over-the-counter pair of inserts which have alleviated some of his discomfort in the past.  They are no longer effective.  Patient does do stretching exercises daily which seems to have alleviated some of his  PCP Julien Mejia DO  Last visit 7/23/24 with Amadeo Black MD     Past Medical History  He has no past medical history on file.    Surgical History  He has a past surgical history that includes Other surgical history (10/06/2022).     Social History  He reports that he has quit smoking. His smoking use included cigarettes. He has been exposed to tobacco smoke. He has never used smokeless tobacco. He reports that he does not currently use alcohol. He reports that he does not use drugs.    Family History  Family History   Problem Relation Name Age of Onset    Other (dyslipidemia) Father      Hypertension Father      Other (neuroendocrince cancer) Father          Allergies  Patient has no known allergies.    Medications  Current Outpatient Medications   Medication Sig Dispense Refill    benzoyl peroxide 5 % external wash Apply 1 Application topically once daily. 236 g 11    cholecalciferol (Vitamin D3) 5,000 Units tablet Take 1 tablet (5,000 Units) by mouth once daily.      clindamycin (Cleocin T) 1 % lotion Apply topically once daily. 60g 60 mL 11    clonazePAM (KlonoPIN) 0.5 mg tablet Take by mouth. TAKE 1/2 TO 1 TABLET AT BEDTIME AS NEEDED      cyanocobalamin (Vitamin B-12) 1,000 mcg tablet Take 1 tablet (1,000 mcg) by mouth once daily.      ketoconazole (NIZOral) 2 % cream Apply topically 2 times a day. To face 45 g 11    lamoTRIgine (LaMICtal) 100 mg tablet Take 1 tablet (100 mg) by mouth once daily.      lithium ER " (Lithobid) 300 mg 12 hr tablet Take 1 tab every 3rd day      lumateperone (Caplyta) 21 mg capsule Take 1 capsule (21 mg) by mouth once daily.      methylphenidate LA (Ritalin LA) 10 mg 24 hr capsule Take 1 capsule (10 mg) by mouth once daily in the morning.      minoxidil (Loniten) 2.5 mg tablet Take 0.5 tablets (1.25 mg) by mouth once daily. 45 tablet 3    NON FORMULARY Methofolate      metFORMIN (Glucophage) 500 mg tablet Take 1 tablet (500 mg) by mouth once daily with breakfast for 7 days, THEN 1 tablet (500 mg) 2 times daily (morning and late afternoon) for 7 days, THEN 2 tablets (1,000 mg) 2 times daily (morning and late afternoon). 100 tablet 3     No current facility-administered medications for this visit.       Review of Systems    REVIEW OF SYSTEMS  GENERAL:  Negative for malaise, significant weight loss, fever  CARDIOVASCULAR: leg swelling   MUSCULOSKELETAL:  Negative for joint pain or swelling, back pain, and muscle pain.  SKIN:  Negative for lesions, rash, and itching  PSYCH:  Negative for sleep disturbance, mood disorder and recent psychosocial stressors  NEURO: Negative, denies any burning, tingling or numbness     Objective:   Vasc: DP and PT pulses are palpable bilateral.  CFT is less than 3 seconds bilateral.  Skin temperature is warm to cool proximal to distal bilateral.  Mild pedal edema is appreciated    Neuro:  Light touch is intact to the foot bilateral.   There is no clonus noted.  The hallux is downgoing bilateral.      Derm: Nails are normal. Skin is supple with normal texture and turgor noted.  Webspaces are clean, dry and intact bilateral.  There are hyperkeratoses on dorsal great toe and medial exit great toe bilateral No ulcerations, verruca or other lesions noted.      Ortho: Muscle strength is 5/5 for all pedal groups tested.  Ankle joint, subtalar joint, 1st MPJ and lesser MPJ ROM is full and without pain or crepitus.  The foot type is rectus bilateral off weight bearing.  Patient  has mild pes planus.  No reproducible pain is    Assessment/Plan     Patient has bilateral pes planus with early plantar fasciitis and arch fatigue.  Patient has painful xerosis.    Will start patient on a urea cream to help aid with callus formation.  Will look into custom foot orthotics for this patient.

## 2025-02-05 ENCOUNTER — APPOINTMENT (OUTPATIENT)
Dept: INTEGRATIVE MEDICINE | Facility: CLINIC | Age: 35
End: 2025-02-05
Payer: COMMERCIAL

## 2025-02-07 ENCOUNTER — OFFICE VISIT (OUTPATIENT)
Dept: PODIATRY | Facility: CLINIC | Age: 35
End: 2025-02-07
Payer: COMMERCIAL

## 2025-02-07 DIAGNOSIS — M72.2 PLANTAR FASCIITIS: Primary | ICD-10-CM

## 2025-02-07 PROCEDURE — L3000 FT INSERT UCB BERKELEY SHELL: HCPCS | Performed by: PODIATRIST

## 2025-02-07 NOTE — PROGRESS NOTES
"History Of Present Illness  Rashid Reyes \"Bernard\" is a 34 y.o. male presenting with chief complaint of: orthotics     Past Medical History  He has no past medical history on file.    Surgical History  He has a past surgical history that includes Other surgical history (10/06/2022).     Social History  He reports that he has quit smoking. His smoking use included cigarettes. He has been exposed to tobacco smoke. He has never used smokeless tobacco. He reports that he does not currently use alcohol. He reports that he does not use drugs.    Family History  Family History   Problem Relation Name Age of Onset    Other (dyslipidemia) Father      Hypertension Father      Other (neuroendocrince cancer) Father          Allergies  Patient has no known allergies.    Medications  Current Outpatient Medications   Medication Sig Dispense Refill    benzoyl peroxide 5 % external wash Apply 1 Application topically once daily. 236 g 11    cholecalciferol (Vitamin D3) 5,000 Units tablet Take 1 tablet (5,000 Units) by mouth once daily.      clindamycin (Cleocin T) 1 % lotion Apply topically once daily. 60g 60 mL 11    clonazePAM (KlonoPIN) 0.5 mg tablet Take by mouth. TAKE 1/2 TO 1 TABLET AT BEDTIME AS NEEDED      cyanocobalamin (Vitamin B-12) 1,000 mcg tablet Take 1 tablet (1,000 mcg) by mouth once daily.      ketoconazole (NIZOral) 2 % cream Apply topically 2 times a day. To face 45 g 11    lamoTRIgine (LaMICtal) 100 mg tablet Take 1 tablet (100 mg) by mouth once daily.      lithium ER (Lithobid) 300 mg 12 hr tablet Take 1 tab every 3rd day      lumateperone (Caplyta) 21 mg capsule Take 1 capsule (21 mg) by mouth once daily.      methylphenidate LA (Ritalin LA) 10 mg 24 hr capsule Take 1 capsule (10 mg) by mouth once daily in the morning.      minoxidil (Loniten) 2.5 mg tablet Take 0.5 tablets (1.25 mg) by mouth once daily. 45 tablet 3    NON FORMULARY Methofolate      urea (Carmol) 40 % cream Apply to callous daily after " bathing 85 g 3    metFORMIN (Glucophage) 500 mg tablet Take 1 tablet (500 mg) by mouth once daily with breakfast for 7 days, THEN 1 tablet (500 mg) 2 times daily (morning and late afternoon) for 7 days, THEN 2 tablets (1,000 mg) 2 times daily (morning and late afternoon). 100 tablet 3     No current facility-administered medications for this visit.       Review of Systems    REVIEW OF SYSTEMS  GENERAL:  Negative for malaise, significant weight loss, fever  CARDIOVASCULAR: leg swelling   MUSCULOSKELETAL:  Negative for joint pain or swelling, back pain, and muscle pain.  SKIN:  Negative for lesions, rash, and itching  PSYCH:  Negative for sleep disturbance, mood disorder and recent psychosocial stressors  NEURO: Negative, denies any burning, tingling or numbness     Objective:        Assessment/Plan     Diagnoses and all orders for this visit:  Plantar fasciitis    Pt is casted for FODS.   Will call pt once inserts have been made and will supply wearing instructions             Jennifer Baldwin CMA

## 2025-03-19 ENCOUNTER — TELEPHONE (OUTPATIENT)
Dept: DERMATOLOGY | Facility: CLINIC | Age: 35
End: 2025-03-19
Payer: COMMERCIAL

## 2025-03-19 NOTE — TELEPHONE ENCOUNTER
Dear BONY CHUNG:     We have tried reaching you regarding your appointment  in the department of Dermatology.     The physician will be out of the office and your appointment date/time has been changed from  THURSDAY MAY 15TH @ 2:45PM to WEDNESDAY MAY 21ST @ 3:00PM.  A message was left for you regarding this change, but in the event that message wasn’t received, we wanted to also send this letter to make sure you receive this information.     If this new appointment date/time is convenient for you, then you do not have to do anything.  If this date/time is not convenient for you, please contact the scheduling office to reschedule, at [phone].      Thank you,  [facility] Department of Dermatology

## 2025-04-02 ENCOUNTER — APPOINTMENT (OUTPATIENT)
Dept: INTEGRATIVE MEDICINE | Facility: CLINIC | Age: 35
End: 2025-04-02
Payer: COMMERCIAL

## 2025-04-02 VITALS — BODY MASS INDEX: 37.97 KG/M2 | WEIGHT: 280 LBS

## 2025-04-02 DIAGNOSIS — F39 MOOD DISORDER (CMS-HCC): ICD-10-CM

## 2025-04-02 DIAGNOSIS — E55.9 VITAMIN D DEFICIENCY: Primary | ICD-10-CM

## 2025-04-02 DIAGNOSIS — E78.00 ELEVATED LDL CHOLESTEROL LEVEL: ICD-10-CM

## 2025-04-02 PROCEDURE — 99214 OFFICE O/P EST MOD 30 MIN: CPT | Performed by: INTERNAL MEDICINE

## 2025-04-02 ASSESSMENT — ENCOUNTER SYMPTOMS
SLEEP DISTURBANCE: 0
HEADACHES: 0
WEAKNESS: 0
NAUSEA: 1
COUGH: 0
APPETITE CHANGE: 0
ARTHRALGIAS: 0
FATIGUE: 0
BACK PAIN: 0
DEPRESSION: 1
FEVER: 0
DIARRHEA: 0
CONSTIPATION: 1
MYALGIAS: 0
SHORTNESS OF BREATH: 0
NERVOUS/ANXIOUS: 1
DIFFICULTY URINATING: 0
DYSURIA: 0

## 2025-04-02 NOTE — PROGRESS NOTES
"Integrative Medicine Follow-up Visit :     Subjective   Patient ID: Rashid Reyes \"Bernard\" is a 34 y.o. male who presents for Weight Loss and Depression       DepressionPatient presents with the following symptoms: nervousness/anxiety (depression).  Patient is not experiencing: shortness of breath and suicidal ideas.        Weight loss: saw [facility] once and was started on metformin. He did not lose any weight being on it for six weeks. Felt like he was trying to following the diet. He was recording his food in the [employer] and still did not lose weight. Hunger was decreased by the metformin. Metformin made his stomach hurt so he stopped and began losing weight again.   He has lost about 18 pounds since January. Noom seems to be helping him. Thinks calorie recording is helping him perhaps.      Using clonazepam once per month for anxiety. Has several left.   New nausea. No change in bowel movements. Goes about once per day. Used to go more frequently. 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   Usually has yogurt with bran cereal for breakfast. He thinks he is getting lots of fruits and vegetables.     Not exercising.   Named chief resident.   Had slight iron deficiency last year.   Still taking vitmain b12.  Increased vitamin d to 10,000 International Units every other day and 5000.international units   Pain:denies    Review of Systems   Constitutional:  Negative for appetite change, fatigue and fever.   HENT:  Negative for congestion and hearing loss.    Eyes:  Negative for visual disturbance.   Respiratory:  Negative for cough and shortness of breath.    Cardiovascular:  Negative for chest pain and leg swelling.   Gastrointestinal:  Positive for constipation and nausea. Negative for diarrhea.   Genitourinary:  Negative for difficulty urinating, dysuria and urgency.   Musculoskeletal:  Negative for arthralgias, back pain and myalgias.   Skin:  Negative for rash.   Neurological:  " Negative for weakness and headaches.   Psychiatric/Behavioral:  Positive for depression. Negative for behavioral problems, sleep disturbance and suicidal ideas. The patient is nervous/anxious (depression).                   Objective   Wt 127 kg (280 lb)   BMI 37.97 kg/m²     Physical Exam  Constitutional:       Appearance: He is obese.   Eyes:      Extraocular Movements: Extraocular movements intact.      Conjunctiva/sclera: Conjunctivae normal.   Neurological:      Mental Status: He is alert.   Psychiatric:         Mood and Affect: Mood normal.         Thought Content: Thought content normal.                      Assessment/Plan     Problem List Items Addressed This Visit             ICD-10-CM    Mood disorder (CMS-HCC) F39    Relevant Orders    Thyroid Stimulating Hormone    CBC and Auto Differential    Basic metabolic panel    Iron and TIBC    Ferritin    BMI 40.0-44.9, adult (Multi) Z68.41    Vitamin D deficiency - Primary E55.9    Relevant Orders    Vitamin D 25-Hydroxy,Total (for eval of Vitamin D levels)     Other Visit Diagnoses         Codes    Elevated LDL cholesterol level     E78.00    Relevant Orders    Lipid panel        Recommend exercise again.   Get labs  Do not think fish oil is good. Recommend algae source for dha/epa.   Start flax seed, ground.  Suggest ground flax seed daily to see if this helps with constipation.   Check out nutritionfacts.org look at videos about fish oil  Increase exercise again.   Get your labs done.     Recommend Follow up in : 2-3 months.     Wendy Sena MD PhD

## 2025-04-02 NOTE — PATIENT INSTRUCTIONS
Suggest ground flax seed daily to see if this helps with constipation.   Check out nutritionfacts.org look at videos about fish oil  Increase exercise again.   Get your labs done.     Follow up in June 2025  Wendy Sena MD PhD

## 2025-05-14 ENCOUNTER — APPOINTMENT (OUTPATIENT)
Dept: UROLOGY | Facility: HOSPITAL | Age: 35
End: 2025-05-14
Payer: COMMERCIAL

## 2025-05-15 ENCOUNTER — APPOINTMENT (OUTPATIENT)
Dept: DERMATOLOGY | Facility: CLINIC | Age: 35
End: 2025-05-15
Payer: COMMERCIAL

## 2025-05-21 ENCOUNTER — APPOINTMENT (OUTPATIENT)
Dept: DERMATOLOGY | Facility: CLINIC | Age: 35
End: 2025-05-21
Payer: COMMERCIAL

## 2025-05-22 ENCOUNTER — APPOINTMENT (OUTPATIENT)
Dept: UROLOGY | Facility: CLINIC | Age: 35
End: 2025-05-22
Payer: COMMERCIAL

## 2025-05-29 ENCOUNTER — APPOINTMENT (OUTPATIENT)
Dept: PRIMARY CARE | Facility: CLINIC | Age: 35
End: 2025-05-29
Payer: COMMERCIAL

## 2025-06-05 ENCOUNTER — APPOINTMENT (OUTPATIENT)
Dept: UROLOGY | Facility: CLINIC | Age: 35
End: 2025-06-05
Payer: COMMERCIAL

## 2025-06-05 VITALS — HEIGHT: 72 IN | WEIGHT: 285 LBS | TEMPERATURE: 97.9 F | BODY MASS INDEX: 38.6 KG/M2

## 2025-06-05 DIAGNOSIS — N50.819 PERSISTENT TESTICULAR PAIN: Primary | ICD-10-CM

## 2025-06-05 DIAGNOSIS — J45.20 MILD INTERMITTENT ASTHMA, UNSPECIFIED WHETHER COMPLICATED (HHS-HCC): ICD-10-CM

## 2025-06-05 PROCEDURE — 3008F BODY MASS INDEX DOCD: CPT

## 2025-06-05 PROCEDURE — 1036F TOBACCO NON-USER: CPT

## 2025-06-05 PROCEDURE — 99204 OFFICE O/P NEW MOD 45 MIN: CPT

## 2025-06-05 ASSESSMENT — PAIN SCALES - GENERAL: PAINLEVEL_OUTOF10: 0-NO PAIN

## 2025-06-05 NOTE — PROGRESS NOTES
Chief complaint:  New Patient Visit (Vasectomy consult)  Referring physician:  No ref. provider found     SUBJECTIVE:    Medical history:   has no past medical history on file.   Surgical history:   has a past surgical history that includes Other surgical history (10/06/2022).  Family history:  family history includes Hypertension in his father; dyslipidemia in his father; neuroendocrince cancer in his father.  Social history:   reports that he has quit smoking. His smoking use included cigarettes. He has been exposed to tobacco smoke. He has never used smokeless tobacco. He reports that he does not currently use alcohol. He reports that he does not use drugs.    Medications:    Current Outpatient Medications   Medication Instructions    benzoyl peroxide 5 % external wash 1 Application, Topical, Daily    Caplyta 21 mg, Daily    cholecalciferol (VITAMIN D3) 5,000 Units, Daily    clindamycin (Cleocin T) 1 % lotion Topical, Daily, 60g    clonazePAM (KlonoPIN) 0.5 mg tablet Take by mouth. TAKE 1/2 TO 1 TABLET AT BEDTIME AS NEEDED    cyanocobalamin (VITAMIN B-12) 1,000 mcg, Daily    ketoconazole (NIZOral) 2 % cream Topical, 2 times daily, To face    lamoTRIgine (LaMICtal) 25 mg tablet 1 tablet, oral, Daily, 75 mg    lithium ER (Lithobid) 300 mg 12 hr tablet Take 1 tab every day    methylphenidate LA (RITALIN LA) 20 mg, Every morning    minoxidil (LONITEN) 1.25 mg, oral, Daily    NON FORMULARY Methofolate      Allergies:    RX Allergies[1]     ROS:  14-point review of systems negative except as noted above.      HPI:  Rashid Reyes is a 35 y.o. male who presents for initial evaluation of vasectomy   The patient has a history of chronic testicular pain secondary to an episode of epididymitis diagnosed at least 15 years ago. He is currently seeking a vasectomy but has concerns regarding the potential impact on his pain. He has three children, and his partner has discontinued contraceptive methods.    OBJECTIVE:  Visit  "Vitals  Temp 36.6 °C (97.9 °F)   Body mass index is 38.65 kg/m².    Physical exam  General:  No acute distress  HEENT:  EOMI  CV:  Regular rate  Pulm:  Nonlabored respirations  Abd:  Soft, non-distended  :  No suprapubic or CVA tenderness  MSK:  No contractures  Neuro:  Motor intact  Psych:  Appropriate affect    Labs:    I have personally reviewed your lab tests  Lab Results   Component Value Date    WBC 4.7 10/06/2022    HGB 15.5 10/06/2022    HCT 47.3 10/06/2022     10/06/2022    ALT 18 10/06/2022    AST 21 10/06/2022     11/27/2023    K 4.6 11/27/2023     11/27/2023    CREATININE 1.12 11/27/2023    BUN 15 11/27/2023    CO2 26 11/27/2023    HGBA1C 4.7 11/27/2023   No results found for: \"URINECULTURE\" No results found for: \"PSA\"    Imaging:    I have personally reviewed images    ASSESSMENT:   Rashid Reyes is a 35 y.o. male presenting for vasectomy consult  I explained that it would be prudent to investigate the cause of his chronic testicular pain before proceeding with the vasectomy. A testicular ultrasound was ordered, and the patient was referred for further management.    PLAN:  Testicular US  Problem List Items Addressed This Visit    None       Follow-up with results  Refer to Dr. Gabriella Magallanes MD    Problem List Items Addressed This Visit    None          [1] No Known Allergies    "

## 2025-06-06 ENCOUNTER — APPOINTMENT (OUTPATIENT)
Dept: RADIOLOGY | Facility: CLINIC | Age: 35
End: 2025-06-06
Payer: COMMERCIAL

## 2025-06-06 DIAGNOSIS — L64.9 ANDROGENIC ALOPECIA: ICD-10-CM

## 2025-06-06 RX ORDER — MINOXIDIL 2.5 MG/1
1.25 TABLET ORAL DAILY
Qty: 45 TABLET | Refills: 4 | Status: SHIPPED | OUTPATIENT
Start: 2025-06-06 | End: 2026-06-06

## 2025-06-10 ENCOUNTER — HOSPITAL ENCOUNTER (OUTPATIENT)
Dept: RADIOLOGY | Facility: CLINIC | Age: 35
Discharge: HOME | End: 2025-06-10
Payer: COMMERCIAL

## 2025-06-10 DIAGNOSIS — N50.819 PERSISTENT TESTICULAR PAIN: ICD-10-CM

## 2025-06-10 PROCEDURE — 93975 VASCULAR STUDY: CPT

## 2025-06-10 PROCEDURE — 76870 US EXAM SCROTUM: CPT | Performed by: RADIOLOGY

## 2025-06-11 LAB
25(OH)D3+25(OH)D2 SERPL-MCNC: 45 NG/ML (ref 30–100)
ANION GAP SERPL CALCULATED.4IONS-SCNC: 11 MMOL/L (CALC) (ref 7–17)
BASOPHILS # BLD AUTO: 0 CELLS/UL (ref 0–200)
BASOPHILS NFR BLD AUTO: 0 %
BUN SERPL-MCNC: 23 MG/DL (ref 7–25)
BUN/CREAT SERPL: NORMAL (CALC) (ref 6–22)
CALCIUM SERPL-MCNC: 9.5 MG/DL (ref 8.6–10.3)
CHLORIDE SERPL-SCNC: 103 MMOL/L (ref 98–110)
CO2 SERPL-SCNC: 25 MMOL/L (ref 20–32)
CREAT SERPL-MCNC: 1.03 MG/DL (ref 0.6–1.26)
EGFRCR SERPLBLD CKD-EPI 2021: 97 ML/MIN/1.73M2
EOSINOPHIL # BLD AUTO: 0 CELLS/UL (ref 15–500)
EOSINOPHIL NFR BLD AUTO: 0 %
ERYTHROCYTE [DISTWIDTH] IN BLOOD BY AUTOMATED COUNT: 13.9 % (ref 11–15)
FERRITIN SERPL-MCNC: 21 NG/ML (ref 38–380)
GLUCOSE SERPL-MCNC: 83 MG/DL (ref 65–99)
HCT VFR BLD AUTO: 50 % (ref 38.5–50)
HGB BLD-MCNC: 16.8 G/DL (ref 13.2–17.1)
IRON SATN MFR SERPL: 18 % (CALC) (ref 20–48)
IRON SERPL-MCNC: 65 MCG/DL (ref 50–180)
LYMPHOCYTES # BLD AUTO: 1875 CELLS/UL (ref 850–3900)
LYMPHOCYTES NFR BLD AUTO: 29.3 %
MCH RBC QN AUTO: 27.7 PG (ref 27–33)
MCHC RBC AUTO-ENTMCNC: 33.6 G/DL (ref 32–36)
MCV RBC AUTO: 82.5 FL (ref 80–100)
MONOCYTES # BLD AUTO: 448 CELLS/UL (ref 200–950)
MONOCYTES NFR BLD AUTO: 7 %
NEUTROPHILS # BLD AUTO: 4077 CELLS/UL (ref 1500–7800)
NEUTROPHILS NFR BLD AUTO: 63.7 %
PLATELET # BLD AUTO: 197 THOUSAND/UL (ref 140–400)
PMV BLD REES-ECKER: 11.8 FL (ref 7.5–12.5)
POTASSIUM SERPL-SCNC: 4.3 MMOL/L (ref 3.5–5.3)
RBC # BLD AUTO: 6.06 MILLION/UL (ref 4.2–5.8)
SODIUM SERPL-SCNC: 139 MMOL/L (ref 135–146)
TIBC SERPL-MCNC: 364 MCG/DL (CALC) (ref 250–425)
TSH SERPL-ACNC: 2 MIU/L (ref 0.4–4.5)
WBC # BLD AUTO: 6.4 THOUSAND/UL (ref 3.8–10.8)

## 2025-06-17 ENCOUNTER — APPOINTMENT (OUTPATIENT)
Dept: UROLOGY | Facility: CLINIC | Age: 35
End: 2025-06-17
Payer: COMMERCIAL

## 2025-06-17 VITALS — TEMPERATURE: 98 F

## 2025-06-17 DIAGNOSIS — N50.819 PERSISTENT TESTICULAR PAIN: ICD-10-CM

## 2025-06-17 PROCEDURE — 99214 OFFICE O/P EST MOD 30 MIN: CPT | Performed by: UROLOGY

## 2025-06-17 PROCEDURE — 1036F TOBACCO NON-USER: CPT | Performed by: UROLOGY

## 2025-06-17 ASSESSMENT — PAIN SCALES - GENERAL: PAINLEVEL_OUTOF10: 0-NO PAIN

## 2025-06-17 NOTE — PROGRESS NOTES
"NPV    Today's visit:        Labs  No results found for: \"TESTOSTERONE\"  No results found for: \"LH\"  No results found for: \"FSH\"  No components found for: \"ESTRADIAL\"  No results found for: \"PSA\"  No components found for: \"CBC\"  No results found for: \"PROLACTIN\"  Lab Results   Component Value Date    HGBA1C 4.7 11/27/2023     No components found for: \"HEMATOCRIT\"      Medications:  Current Medications[1]    Allergy:  Allergies[2]     Exam  CONSTITUTIONAL:        No acute distress    HEAD:        Normocephalic and atraumatic    CHEST / RESPIRATORY      no excess work of breathing, no respiratory distress,    ABDOMEN / GASTROINTESTINAL:        Abdomen nondistended          Assessment/Plan         [1]   Current Outpatient Medications:     cholecalciferol (Vitamin D3) 5,000 Units tablet, Take 1 tablet (5,000 Units) by mouth once daily., Disp: , Rfl:     clonazePAM (KlonoPIN) 0.5 mg tablet, Take by mouth. TAKE 1/2 TO 1 TABLET AT BEDTIME AS NEEDED, Disp: , Rfl:     cyanocobalamin (Vitamin B-12) 1,000 mcg tablet, Take 1 tablet (1,000 mcg) by mouth once daily., Disp: , Rfl:     ketoconazole (NIZOral) 2 % cream, Apply topically 2 times a day. To face, Disp: 45 g, Rfl: 11    lamoTRIgine (LaMICtal) 25 mg tablet, Take 1 tablet (25 mg) by mouth once daily. 75 mg, Disp: , Rfl:     lithium ER (Lithobid) 300 mg 12 hr tablet, Take 1 tab every day, Disp: , Rfl:     lumateperone (Caplyta) 21 mg capsule, Take 1 capsule (21 mg) by mouth once daily., Disp: , Rfl:     methylphenidate LA (Ritalin LA) 10 mg 24 hr capsule, Take 2 capsules (20 mg) by mouth once daily in the morning., Disp: , Rfl:     minoxidil (Loniten) 2.5 mg tablet, TAKE 0.5 TABLETS (1.25 MG) BY MOUTH ONCE DAILY., Disp: 45 tablet, Rfl: 4    NON FORMULARY, Methofolate, Disp: , Rfl:   [2] No Known Allergies    "

## 2025-06-17 NOTE — PROGRESS NOTES
"HPI:  35 y.o. yo male patient complains of  scrotal pain    #Scrotal pain   How long has this been going on- years, over time has become tolerable  which side/ or both- left side (hx epididymitis-tx with Doxy in [place])  where is pain located- left side  any noticeable swelling- no  what was tried to relieve pain- tolerable over time, contact worsens  History of UTI/ STD exposure- epididymitis  History of vasectomy-  no but wants one?  Has already done PFPT- helped but has not done in a while     No results found for: \"PSA\"  No components found for: \"CBC\"  Lab Results   Component Value Date    HGBA1C 4.7 11/27/2023     Psych resident at     === 06/10/25 ===    US SCROTUM WITH DOPPLERS, personally reviewed/interpreted:     - Impression -  No sign of intratesticular mass or torsion. Small right hydrocele with echogenic debris. Area of heterogenicity at the left epididymal tail of uncertain significance. There is no associated hyperemia.    PMH:  Medical History[1]     PSH:  Surgical History[2]     Medications:  Current Medications[3]    Allergy:  Allergies[4]     Exam  Testicles descended bilaterally, nontender, no masses  Vasa palpable bilaterally  Varicocele: No  Penis circ'd, no lesions, no plaques  Mild tenderness over left epididymis    Assessment/Plan  #scrotal pain -   Discussed scrotal pain and its different etiologies, including epididymitis, cancer, orchitis, etc.  Discussed workup for epididymo-orchitis could include US, urine culture, and GC/CT  Discussed that even if he has a varicocele, there is no guarantee that this pain is from varicocele and that varicocele repair will alleviate the pain  Discussed cord block and microsurgical cord denervation in detail, including risks benefits and alternatives  Discussed conservative measures like nsaids, elevation, ice, tight underwear etc.  Also discussed pelvic floor PT    Patient elects   Pelvic floor PT - referral provided     #vasectomy " evaluation  Discussed vas in detail and that testicular pain can worsen after vasectomy  -discussed post vasectomy pain syndrome in detail    Scribe Attestation  By signing my name below, I, Kaley Kay Ciara , Marek   attest that this documentation has been prepared under the direction and in the presence of Yeimy Quiroz MD.           [1] No past medical history on file.  [2]   Past Surgical History:  Procedure Laterality Date    OTHER SURGICAL HISTORY  10/06/2022    No history of surgery   [3]   Current Outpatient Medications:     cholecalciferol (Vitamin D3) 5,000 Units tablet, Take 1 tablet (5,000 Units) by mouth once daily., Disp: , Rfl:     clonazePAM (KlonoPIN) 0.5 mg tablet, Take by mouth. TAKE 1/2 TO 1 TABLET AT BEDTIME AS NEEDED, Disp: , Rfl:     cyanocobalamin (Vitamin B-12) 1,000 mcg tablet, Take 1 tablet (1,000 mcg) by mouth once daily., Disp: , Rfl:     ketoconazole (NIZOral) 2 % cream, Apply topically 2 times a day. To face, Disp: 45 g, Rfl: 11    lamoTRIgine (LaMICtal) 25 mg tablet, Take 1 tablet (25 mg) by mouth once daily. 75 mg, Disp: , Rfl:     lithium ER (Lithobid) 300 mg 12 hr tablet, Take 1 tab every day, Disp: , Rfl:     lumateperone (Caplyta) 21 mg capsule, Take 1 capsule (21 mg) by mouth once daily., Disp: , Rfl:     methylphenidate LA (Ritalin LA) 10 mg 24 hr capsule, Take 2 capsules (20 mg) by mouth once daily in the morning., Disp: , Rfl:     minoxidil (Loniten) 2.5 mg tablet, TAKE 0.5 TABLETS (1.25 MG) BY MOUTH ONCE DAILY., Disp: 45 tablet, Rfl: 4    NON FORMULARY, Methofolate, Disp: , Rfl:   [4] No Known Allergies

## 2025-06-27 ENCOUNTER — APPOINTMENT (OUTPATIENT)
Dept: PRIMARY CARE | Facility: CLINIC | Age: 35
End: 2025-06-27
Payer: COMMERCIAL

## 2025-07-09 ENCOUNTER — APPOINTMENT (OUTPATIENT)
Dept: SLEEP MEDICINE | Facility: HOSPITAL | Age: 35
End: 2025-07-09
Payer: COMMERCIAL

## 2025-07-21 ENCOUNTER — APPOINTMENT (OUTPATIENT)
Dept: INTEGRATIVE MEDICINE | Facility: CLINIC | Age: 35
End: 2025-07-21
Payer: COMMERCIAL

## 2025-07-29 ENCOUNTER — APPOINTMENT (OUTPATIENT)
Dept: INTEGRATIVE MEDICINE | Facility: CLINIC | Age: 35
End: 2025-07-29
Payer: COMMERCIAL

## 2025-07-30 ENCOUNTER — HOSPITAL ENCOUNTER (EMERGENCY)
Facility: HOSPITAL | Age: 35
Discharge: HOME | End: 2025-07-31
Attending: EMERGENCY MEDICINE
Payer: COMMERCIAL

## 2025-07-30 VITALS
BODY MASS INDEX: 39.96 KG/M2 | HEIGHT: 72 IN | OXYGEN SATURATION: 97 % | DIASTOLIC BLOOD PRESSURE: 93 MMHG | HEART RATE: 97 BPM | SYSTOLIC BLOOD PRESSURE: 152 MMHG | TEMPERATURE: 97.9 F | WEIGHT: 295 LBS | RESPIRATION RATE: 21 BRPM

## 2025-07-30 DIAGNOSIS — T14.8XXA ANIMAL BITE: Primary | ICD-10-CM

## 2025-07-30 PROCEDURE — 2500000001 HC RX 250 WO HCPCS SELF ADMINISTERED DRUGS (ALT 637 FOR MEDICARE OP)

## 2025-07-30 PROCEDURE — 99284 EMERGENCY DEPT VISIT MOD MDM: CPT | Performed by: EMERGENCY MEDICINE

## 2025-07-30 PROCEDURE — 99283 EMERGENCY DEPT VISIT LOW MDM: CPT

## 2025-07-30 RX ORDER — AMOXICILLIN AND CLAVULANATE POTASSIUM 875; 125 MG/1; MG/1
1 TABLET, FILM COATED ORAL 2 TIMES DAILY
Qty: 20 TABLET | Refills: 0 | Status: SHIPPED | OUTPATIENT
Start: 2025-07-30 | End: 2025-08-10

## 2025-07-30 RX ORDER — AMOXICILLIN AND CLAVULANATE POTASSIUM 875; 125 MG/1; MG/1
1 TABLET, FILM COATED ORAL ONCE
Status: COMPLETED | OUTPATIENT
Start: 2025-07-30 | End: 2025-07-30

## 2025-07-30 RX ADMIN — AMOXICILLIN AND CLAVULANATE POTASSIUM 1 TABLET: 875; 125 TABLET, FILM COATED ORAL at 23:44

## 2025-07-30 ASSESSMENT — LIFESTYLE VARIABLES
HAVE PEOPLE ANNOYED YOU BY CRITICIZING YOUR DRINKING: NO
TOTAL SCORE: 0
EVER FELT BAD OR GUILTY ABOUT YOUR DRINKING: NO
EVER HAD A DRINK FIRST THING IN THE MORNING TO STEADY YOUR NERVES TO GET RID OF A HANGOVER: NO
HAVE YOU EVER FELT YOU SHOULD CUT DOWN ON YOUR DRINKING: NO

## 2025-07-30 ASSESSMENT — PAIN SCALES - GENERAL: PAINLEVEL_OUTOF10: 3

## 2025-07-30 ASSESSMENT — PAIN - FUNCTIONAL ASSESSMENT: PAIN_FUNCTIONAL_ASSESSMENT: 0-10

## 2025-07-30 ASSESSMENT — PAIN DESCRIPTION - LOCATION: LOCATION: LEG

## 2025-07-30 ASSESSMENT — PAIN DESCRIPTION - PROGRESSION: CLINICAL_PROGRESSION: NOT CHANGED

## 2025-07-31 NOTE — ED PROVIDER NOTES
Emergency Department Provider Note        History of Present Illness     History provided by: Patient  Limitations to History: None  External Records Reviewed with Brief Summary: prior primary care visit    HPI:  Rashid Reyes is a 35 y.o. male with PMH of NAHOMI presents for animal bite. He reports was bitten by an unvaccinated dog while walking with child in [facility]. He reports dog was on leash and ran towards him and bit the back of his legs. Patient denies fever, shortness of breath, chest pain. He reports mild pain to bite site. He reports the bite was over his clothes.    Physical Exam   Triage vitals:  T 36.6 °C (97.9 °F)  HR 97  BP (!) 152/93  RR (!) 21  O2 97 % None (Room air)    General: Awake, alert, in no acute distress  Eyes: Gaze conjugate.  No scleral icterus or injection  HENT: Normo-cephalic, atraumatic. No stridor  CV: Regular rate, regular rhythm.   Resp: Breathing non-labored, speaking in full sentences.    GI: Soft, non-distended.  MSK/Extremities: No gross bony deformities. Moving all extremities, skin abrasion to left posterio-medial leg and to proximal posterior right leg, no pus drainage or fluid collection, no deeper skin breakdown or foreign body noted  Skin: Warm. Appropriate color  Neuro: Alert. Oriented. Face symmetric. Speech is fluent.  Gross strength and sensation intact in b/l UE and LEs  Psych: Appropriate mood and affect    Medical Decision Making & ED Course   Medical Decision Makin y.o. male here for animal bite. Exam shows superficial skin abrasion to posterior leg. No foreign body noted. No indication for imaging at this time. No current signs of infection. Discussed rabies vaccination and immunoglobulin at this time. He reports not wanting to observe animal or wanting to contact owner again at this time. He reports wanting rabies vaccination and immunoglobulin at this time. We discussed rare occurrence of rabies. Patient was amenable to watch animal and not  receive rabies vaccine at this time given rare occurrence. Message was left with public health with report on animal. Patient was prescribed antibiotics. As a result of the work-up, patient was discharged home.  They were informed of their diagnosis and instructed to come back with any concerns or worsening of condition and was agreeable to the plan as discussed above.  The patient was given the opportunity to ask questions.  All of the patient's questions were answered.  The patient remained stable under my care.     ----  Differential diagnoses considered include but are not limited to: local infection, cellulitis, laceration, foreign body     Social Determinants of Health which Significantly Impact Care: None identified     EKG Independent Interpretation: EKG not obtained    Independent Result Review and Interpretation: None obtained    Chronic conditions affecting the patient's care: As documented above in [facility]    The patient was discussed with the following consultants/services: None    Care Considerations: As documented above in [facility]    ED Course:  Diagnoses as of 07/30/25 6440   Animal bite     Disposition   As a result of the work-up, the patient was discharged home.  he was informed of his diagnosis and instructed to come back with any concerns or worsening of condition.  he and was agreeable to the plan as discussed above.  he was given the opportunity to ask questions.  All of the patient's questions were answered.    Procedures   Procedures    Patient seen and discussed with ED attending physician.    Breanne Rodriguez MD  Emergency Medicine     Breanne Rodriguez MD  Resident  07/31/25 0029

## 2025-07-31 NOTE — DISCHARGE INSTRUCTIONS
Please follow up with your primary care doctor.    Return if you have:  Temperature greater than 100.4  Signs of infection: pus draining warmth redness  Severe uncontrolled pain  Any other questions or concerns you may have after discharge    In an emergency, call 911 or go to an Emergency Department at a nearby hospital

## 2025-08-18 ENCOUNTER — APPOINTMENT (OUTPATIENT)
Dept: PRIMARY CARE | Facility: CLINIC | Age: 35
End: 2025-08-18
Payer: COMMERCIAL

## 2025-08-18 VITALS
BODY MASS INDEX: 40.23 KG/M2 | OXYGEN SATURATION: 97 % | HEIGHT: 72 IN | SYSTOLIC BLOOD PRESSURE: 127 MMHG | HEART RATE: 82 BPM | WEIGHT: 297 LBS | DIASTOLIC BLOOD PRESSURE: 80 MMHG

## 2025-08-18 DIAGNOSIS — G47.33 MODERATE OBSTRUCTIVE SLEEP APNEA: ICD-10-CM

## 2025-08-18 DIAGNOSIS — R03.0 ELEVATED BLOOD PRESSURE READING WITHOUT DIAGNOSIS OF HYPERTENSION: Primary | ICD-10-CM

## 2025-08-18 DIAGNOSIS — Z00.00 HEALTH CARE MAINTENANCE: ICD-10-CM

## 2025-08-18 PROCEDURE — 99395 PREV VISIT EST AGE 18-39: CPT | Performed by: INTERNAL MEDICINE

## 2025-08-18 PROCEDURE — 3008F BODY MASS INDEX DOCD: CPT | Performed by: INTERNAL MEDICINE

## 2025-08-18 RX ORDER — LAMOTRIGINE 25 MG/1
50 TABLET ORAL DAILY
Qty: 90 TABLET | Refills: 0 | Status: SHIPPED | OUTPATIENT
Start: 2025-08-18

## 2025-08-18 RX ORDER — LITHIUM CARBONATE 300 MG/1
300 TABLET, FILM COATED, EXTENDED RELEASE ORAL NIGHTLY
Qty: 1 TABLET | Refills: 0 | Status: SHIPPED | OUTPATIENT
Start: 2025-08-18

## 2025-08-18 ASSESSMENT — PATIENT HEALTH QUESTIONNAIRE - PHQ9
SUM OF ALL RESPONSES TO PHQ9 QUESTIONS 1 AND 2: 0
1. LITTLE INTEREST OR PLEASURE IN DOING THINGS: NOT AT ALL
2. FEELING DOWN, DEPRESSED OR HOPELESS: NOT AT ALL

## 2025-09-22 ENCOUNTER — APPOINTMENT (OUTPATIENT)
Dept: SLEEP MEDICINE | Facility: HOSPITAL | Age: 35
End: 2025-09-22
Payer: COMMERCIAL

## 2025-11-05 ENCOUNTER — APPOINTMENT (OUTPATIENT)
Dept: DERMATOLOGY | Facility: CLINIC | Age: 35
End: 2025-11-05
Payer: COMMERCIAL